# Patient Record
Sex: MALE | Race: WHITE | Employment: OTHER | URBAN - METROPOLITAN AREA
[De-identification: names, ages, dates, MRNs, and addresses within clinical notes are randomized per-mention and may not be internally consistent; named-entity substitution may affect disease eponyms.]

---

## 2017-04-04 ENCOUNTER — HOSPITAL ENCOUNTER (OUTPATIENT)
Age: 67
Setting detail: SPECIMEN
Discharge: HOME | End: 2017-04-04
Payer: MEDICARE

## 2017-04-04 LAB
ALT SERPL-CCNC: 16 U/L (ref 5–41)
AST SERPL-CCNC: 18 U/L
CHOLESTEROL/HDL RATIO: 3
CHOLESTEROL: 138 MG/DL
HDLC SERPL-MCNC: 46 MG/DL
LDL CHOLESTEROL: 71 MG/DL (ref 0–130)
TRIGL SERPL-MCNC: 104 MG/DL
VLDLC SERPL CALC-MCNC: NORMAL MG/DL (ref 1–30)

## 2017-08-06 ENCOUNTER — APPOINTMENT (OUTPATIENT)
Dept: GENERAL RADIOLOGY | Age: 67
End: 2017-08-06
Payer: MEDICARE

## 2017-08-06 ENCOUNTER — APPOINTMENT (OUTPATIENT)
Dept: CT IMAGING | Age: 67
End: 2017-08-06
Payer: MEDICARE

## 2017-08-06 ENCOUNTER — HOSPITAL ENCOUNTER (EMERGENCY)
Age: 67
Discharge: HOME | End: 2017-08-07
Attending: EMERGENCY MEDICINE
Payer: MEDICARE

## 2017-08-06 VITALS
SYSTOLIC BLOOD PRESSURE: 104 MMHG | BODY MASS INDEX: 30.48 KG/M2 | HEART RATE: 70 BPM | OXYGEN SATURATION: 96 % | RESPIRATION RATE: 16 BRPM | DIASTOLIC BLOOD PRESSURE: 51 MMHG | HEIGHT: 72 IN | WEIGHT: 225 LBS | TEMPERATURE: 98.1 F

## 2017-08-06 DIAGNOSIS — S42.211A FRACTURE, HUMERUS, NECK, RIGHT, CLOSED, INITIAL ENCOUNTER: Primary | ICD-10-CM

## 2017-08-06 PROCEDURE — 96374 THER/PROPH/DIAG INJ IV PUSH: CPT

## 2017-08-06 PROCEDURE — 73200 CT UPPER EXTREMITY W/O DYE: CPT

## 2017-08-06 PROCEDURE — 73020 X-RAY EXAM OF SHOULDER: CPT

## 2017-08-06 PROCEDURE — 29240 STRAPPING OF SHOULDER: CPT

## 2017-08-06 PROCEDURE — 99283 EMERGENCY DEPT VISIT LOW MDM: CPT

## 2017-08-06 PROCEDURE — 6360000002 HC RX W HCPCS: Performed by: NURSE PRACTITIONER

## 2017-08-06 RX ORDER — MORPHINE SULFATE 2 MG/ML
2 INJECTION, SOLUTION INTRAMUSCULAR; INTRAVENOUS ONCE
Status: DISCONTINUED | OUTPATIENT
Start: 2017-08-06 | End: 2017-08-06

## 2017-08-06 RX ORDER — ATORVASTATIN CALCIUM 20 MG/1
20 TABLET, FILM COATED ORAL DAILY
COMMUNITY

## 2017-08-06 RX ORDER — MORPHINE SULFATE 2 MG/ML
2 INJECTION, SOLUTION INTRAMUSCULAR; INTRAVENOUS ONCE
Status: COMPLETED | OUTPATIENT
Start: 2017-08-06 | End: 2017-08-06

## 2017-08-06 RX ADMIN — MORPHINE SULFATE 2 MG: 2 INJECTION, SOLUTION INTRAMUSCULAR; INTRAVENOUS at 23:02

## 2017-08-06 ASSESSMENT — PAIN DESCRIPTION - LOCATION: LOCATION: SHOULDER

## 2017-08-06 ASSESSMENT — PAIN SCALES - GENERAL
PAINLEVEL_OUTOF10: 5
PAINLEVEL_OUTOF10: 1
PAINLEVEL_OUTOF10: 5

## 2017-08-06 ASSESSMENT — PAIN DESCRIPTION - ORIENTATION: ORIENTATION: RIGHT

## 2017-08-06 ASSESSMENT — PAIN DESCRIPTION - PAIN TYPE: TYPE: ACUTE PAIN

## 2017-08-07 PROCEDURE — 96375 TX/PRO/DX INJ NEW DRUG ADDON: CPT

## 2017-08-07 PROCEDURE — 6360000002 HC RX W HCPCS: Performed by: NURSE PRACTITIONER

## 2017-08-07 RX ORDER — OXYCODONE HYDROCHLORIDE AND ACETAMINOPHEN 5; 325 MG/1; MG/1
1 TABLET ORAL EVERY 6 HOURS PRN
Qty: 20 TABLET | Refills: 0 | Status: ON HOLD | OUTPATIENT
Start: 2017-08-07 | End: 2017-12-05

## 2017-08-07 RX ADMIN — HYDROMORPHONE HYDROCHLORIDE 1 MG: 1 INJECTION, SOLUTION INTRAMUSCULAR; INTRAVENOUS; SUBCUTANEOUS at 00:44

## 2017-08-07 ASSESSMENT — PAIN SCALES - GENERAL: PAINLEVEL_OUTOF10: 10

## 2017-12-05 ENCOUNTER — APPOINTMENT (OUTPATIENT)
Dept: CT IMAGING | Age: 67
DRG: 242 | End: 2017-12-05
Attending: INTERNAL MEDICINE
Payer: MEDICARE

## 2017-12-05 ENCOUNTER — HOSPITAL ENCOUNTER (INPATIENT)
Age: 67
LOS: 3 days | Discharge: HOME | DRG: 242 | End: 2017-12-08
Attending: INTERNAL MEDICINE | Admitting: INTERNAL MEDICINE
Payer: MEDICARE

## 2017-12-05 PROBLEM — R55 CARDIAC RELATED SYNCOPE: Status: ACTIVE | Noted: 2017-12-05

## 2017-12-05 PROBLEM — R00.1 BRADYCARDIA: Status: ACTIVE | Noted: 2017-12-05

## 2017-12-05 LAB
ABSOLUTE EOS #: 0.16 K/UL (ref 0–0.44)
ABSOLUTE IMMATURE GRANULOCYTE: 0.04 K/UL (ref 0–0.3)
ABSOLUTE LYMPH #: 1.35 K/UL (ref 1.1–3.7)
ABSOLUTE MONO #: 1.05 K/UL (ref 0.1–1.2)
ANION GAP SERPL CALCULATED.3IONS-SCNC: 19 MMOL/L (ref 9–17)
BASOPHILS # BLD: 0 % (ref 0–2)
BASOPHILS ABSOLUTE: 0.03 K/UL (ref 0–0.2)
BUN BLDV-MCNC: 73 MG/DL (ref 8–23)
BUN/CREAT BLD: ABNORMAL (ref 9–20)
CALCIUM SERPL-MCNC: 8.6 MG/DL (ref 8.6–10.4)
CHLORIDE BLD-SCNC: 94 MMOL/L (ref 98–107)
CO2: 16 MMOL/L (ref 20–31)
CREAT SERPL-MCNC: 4.02 MG/DL (ref 0.7–1.2)
DIFFERENTIAL TYPE: ABNORMAL
EKG ATRIAL RATE: 33 BPM
EKG P AXIS: -16 DEGREES
EKG P-R INTERVAL: 188 MS
EKG Q-T INTERVAL: 606 MS
EKG QRS DURATION: 130 MS
EKG QTC CALCULATION (BAZETT): 448 MS
EKG R AXIS: -44 DEGREES
EKG T AXIS: 57 DEGREES
EKG VENTRICULAR RATE: 33 BPM
EOSINOPHILS RELATIVE PERCENT: 2 % (ref 1–4)
GFR AFRICAN AMERICAN: 18 ML/MIN
GFR NON-AFRICAN AMERICAN: 15 ML/MIN
GFR SERPL CREATININE-BSD FRML MDRD: ABNORMAL ML/MIN/{1.73_M2}
GFR SERPL CREATININE-BSD FRML MDRD: ABNORMAL ML/MIN/{1.73_M2}
GLUCOSE BLD-MCNC: 127 MG/DL (ref 70–99)
GLUCOSE BLD-MCNC: 148 MG/DL (ref 75–110)
HCT VFR BLD CALC: 29.3 % (ref 40.7–50.3)
HEMOGLOBIN: 8.6 G/DL (ref 13–17)
IMMATURE GRANULOCYTES: 1 %
LYMPHOCYTES # BLD: 16 % (ref 24–43)
MAGNESIUM: 2.3 MG/DL (ref 1.6–2.6)
MCH RBC QN AUTO: 26.5 PG (ref 25.2–33.5)
MCHC RBC AUTO-ENTMCNC: 29.4 G/DL (ref 28.4–34.8)
MCV RBC AUTO: 90.2 FL (ref 82.6–102.9)
MONOCYTES # BLD: 13 % (ref 3–12)
PDW BLD-RTO: 19.2 % (ref 11.8–14.4)
PLATELET # BLD: 120 K/UL (ref 138–453)
PLATELET ESTIMATE: ABNORMAL
PMV BLD AUTO: 12.4 FL (ref 8.1–13.5)
POTASSIUM SERPL-SCNC: 5.2 MMOL/L (ref 3.7–5.3)
RBC # BLD: 3.25 M/UL (ref 4.21–5.77)
RBC # BLD: ABNORMAL 10*6/UL
SEG NEUTROPHILS: 68 % (ref 36–65)
SEGMENTED NEUTROPHILS ABSOLUTE COUNT: 5.66 K/UL (ref 1.5–8.1)
SODIUM BLD-SCNC: 129 MMOL/L (ref 135–144)
THYROXINE, FREE: 1.17 NG/DL (ref 0.93–1.7)
TROPONIN INTERP: NORMAL
TROPONIN T: <0.03 NG/ML
TSH SERPL DL<=0.05 MIU/L-ACNC: 16.7 MIU/L (ref 0.3–5)
WBC # BLD: 8.3 K/UL (ref 3.5–11.3)
WBC # BLD: ABNORMAL 10*3/UL

## 2017-12-05 PROCEDURE — 2000000000 HC ICU R&B

## 2017-12-05 PROCEDURE — 85025 COMPLETE CBC W/AUTO DIFF WBC: CPT

## 2017-12-05 PROCEDURE — 36415 COLL VENOUS BLD VENIPUNCTURE: CPT

## 2017-12-05 PROCEDURE — 82947 ASSAY GLUCOSE BLOOD QUANT: CPT

## 2017-12-05 PROCEDURE — 84439 ASSAY OF FREE THYROXINE: CPT

## 2017-12-05 PROCEDURE — 6360000002 HC RX W HCPCS: Performed by: HOSPITALIST

## 2017-12-05 PROCEDURE — 93005 ELECTROCARDIOGRAM TRACING: CPT

## 2017-12-05 PROCEDURE — 84484 ASSAY OF TROPONIN QUANT: CPT

## 2017-12-05 PROCEDURE — 80048 BASIC METABOLIC PNL TOTAL CA: CPT

## 2017-12-05 PROCEDURE — 70450 CT HEAD/BRAIN W/O DYE: CPT

## 2017-12-05 PROCEDURE — 83735 ASSAY OF MAGNESIUM: CPT

## 2017-12-05 PROCEDURE — 84443 ASSAY THYROID STIM HORMONE: CPT

## 2017-12-05 RX ORDER — HYDRALAZINE HYDROCHLORIDE 20 MG/ML
10 INJECTION INTRAMUSCULAR; INTRAVENOUS EVERY 4 HOURS PRN
Status: DISCONTINUED | OUTPATIENT
Start: 2017-12-05 | End: 2017-12-08 | Stop reason: HOSPADM

## 2017-12-05 RX ORDER — ATORVASTATIN CALCIUM 20 MG/1
20 TABLET, FILM COATED ORAL DAILY
Status: DISCONTINUED | OUTPATIENT
Start: 2017-12-05 | End: 2017-12-05

## 2017-12-05 RX ORDER — DEXTROSE MONOHYDRATE 25 G/50ML
12.5 INJECTION, SOLUTION INTRAVENOUS PRN
Status: DISCONTINUED | OUTPATIENT
Start: 2017-12-05 | End: 2017-12-08 | Stop reason: HOSPADM

## 2017-12-05 RX ORDER — ATORVASTATIN CALCIUM 20 MG/1
20 TABLET, FILM COATED ORAL NIGHTLY
Status: DISCONTINUED | OUTPATIENT
Start: 2017-12-06 | End: 2017-12-08 | Stop reason: HOSPADM

## 2017-12-05 RX ORDER — SULFAMETHOXAZOLE AND TRIMETHOPRIM 400; 80 MG/1; MG/1
1 TABLET ORAL DAILY
Status: ON HOLD | COMMUNITY
End: 2017-12-08 | Stop reason: HOSPADM

## 2017-12-05 RX ORDER — NICOTINE POLACRILEX 4 MG
15 LOZENGE BUCCAL PRN
Status: DISCONTINUED | OUTPATIENT
Start: 2017-12-05 | End: 2017-12-08 | Stop reason: HOSPADM

## 2017-12-05 RX ORDER — MAGNESIUM SULFATE 1 G/100ML
1 INJECTION INTRAVENOUS PRN
Status: DISCONTINUED | OUTPATIENT
Start: 2017-12-05 | End: 2017-12-06

## 2017-12-05 RX ORDER — SODIUM CHLORIDE 0.9 % (FLUSH) 0.9 %
10 SYRINGE (ML) INJECTION EVERY 12 HOURS SCHEDULED
Status: DISCONTINUED | OUTPATIENT
Start: 2017-12-05 | End: 2017-12-08 | Stop reason: HOSPADM

## 2017-12-05 RX ORDER — AMIODARONE HYDROCHLORIDE 200 MG/1
200 TABLET ORAL 2 TIMES DAILY
Status: ON HOLD | COMMUNITY
End: 2017-12-08 | Stop reason: HOSPADM

## 2017-12-05 RX ORDER — ASPIRIN 81 MG/1
81 TABLET, CHEWABLE ORAL DAILY
Status: DISCONTINUED | OUTPATIENT
Start: 2017-12-05 | End: 2017-12-08 | Stop reason: HOSPADM

## 2017-12-05 RX ORDER — SODIUM CHLORIDE 9 MG/ML
INJECTION, SOLUTION INTRAVENOUS CONTINUOUS
Status: DISCONTINUED | OUTPATIENT
Start: 2017-12-05 | End: 2017-12-08 | Stop reason: HOSPADM

## 2017-12-05 RX ORDER — SODIUM CHLORIDE 0.9 % (FLUSH) 0.9 %
10 SYRINGE (ML) INJECTION PRN
Status: DISCONTINUED | OUTPATIENT
Start: 2017-12-05 | End: 2017-12-08 | Stop reason: HOSPADM

## 2017-12-05 RX ORDER — FUROSEMIDE 20 MG/1
20 TABLET ORAL DAILY
Status: DISCONTINUED | OUTPATIENT
Start: 2017-12-05 | End: 2017-12-05

## 2017-12-05 RX ORDER — ACETAMINOPHEN 325 MG/1
650 TABLET ORAL EVERY 4 HOURS PRN
Status: DISCONTINUED | OUTPATIENT
Start: 2017-12-05 | End: 2017-12-08 | Stop reason: HOSPADM

## 2017-12-05 RX ORDER — POTASSIUM CHLORIDE 20 MEQ/1
40 TABLET, EXTENDED RELEASE ORAL PRN
Status: DISCONTINUED | OUTPATIENT
Start: 2017-12-05 | End: 2017-12-06

## 2017-12-05 RX ORDER — DEXTROSE MONOHYDRATE 50 MG/ML
100 INJECTION, SOLUTION INTRAVENOUS PRN
Status: DISCONTINUED | OUTPATIENT
Start: 2017-12-05 | End: 2017-12-08 | Stop reason: HOSPADM

## 2017-12-05 RX ORDER — CIPROFLOXACIN 500 MG/1
500 TABLET, FILM COATED ORAL 2 TIMES DAILY
Status: ON HOLD | COMMUNITY
End: 2017-12-08 | Stop reason: HOSPADM

## 2017-12-05 RX ORDER — TOLTERODINE 4 MG/1
4 CAPSULE, EXTENDED RELEASE ORAL DAILY
COMMUNITY

## 2017-12-05 RX ORDER — CIPROFLOXACIN 500 MG/1
500 TABLET, FILM COATED ORAL 2 TIMES DAILY
Status: DISCONTINUED | OUTPATIENT
Start: 2017-12-05 | End: 2017-12-08 | Stop reason: HOSPADM

## 2017-12-05 RX ORDER — POTASSIUM CHLORIDE 20MEQ/15ML
40 LIQUID (ML) ORAL PRN
Status: DISCONTINUED | OUTPATIENT
Start: 2017-12-05 | End: 2017-12-06

## 2017-12-05 RX ORDER — ONDANSETRON 2 MG/ML
4 INJECTION INTRAMUSCULAR; INTRAVENOUS EVERY 6 HOURS PRN
Status: DISCONTINUED | OUTPATIENT
Start: 2017-12-05 | End: 2017-12-08 | Stop reason: HOSPADM

## 2017-12-05 RX ORDER — DOPAMINE HYDROCHLORIDE 160 MG/100ML
5 INJECTION, SOLUTION INTRAVENOUS CONTINUOUS
Status: DISCONTINUED | OUTPATIENT
Start: 2017-12-05 | End: 2017-12-07

## 2017-12-05 RX ORDER — POTASSIUM CHLORIDE 7.45 MG/ML
10 INJECTION INTRAVENOUS PRN
Status: DISCONTINUED | OUTPATIENT
Start: 2017-12-05 | End: 2017-12-06

## 2017-12-05 RX ADMIN — DOPAMINE HYDROCHLORIDE 10 MCG/KG/MIN: 160 INJECTION, SOLUTION INTRAVENOUS at 16:45

## 2017-12-05 NOTE — PROGRESS NOTES
Dr. John Ibarra at bedside, life pack placed on patient, order received to transfer patient to cardiac icu. EKG completed and reviewed by resident and fellow.    Electronically signed by Kaylee Mena RN on 12/5/2017 at 3:58 PM

## 2017-12-05 NOTE — CARE COORDINATION
Case Management Initial Discharge Plan  Scooterkashmir Carter,         Readmission Risk              Readmission Risk:        25.5       Age 71 or Greater:  1    Admitted from SNF or Requires Paid or Family Care:  2    Currently has CHF,COPD,ARF,CRI,or is on dialysis:  4    Takes more than 5 Prescription Medications:  4    Takes Digoxin,Insulin,Anticoagulants,Narcotics or ASA/Plavix:  [address] in Past 12 Months:  10    On Disability:  0    Patient Considers own Health:  2.5            Met with:patient to discuss discharge plans. Information verified: address, contacts, phone number, , insurance Yes  PCP: Isamar Herron MD  Date of last visit: Dec 2017    Insurance Provider: Medicare    Discharge Planning  Current Residence:  Private Residence  Living Arrangements:  Spouse/Significant Other   Home has 2 stories/10 stairs to climb  Support Systems:  Spouse/Significant Other, Family Members, Home Care Staff  Current Services PTA:  Durable Medical Equipment, Home Care   Patient able to perform ADL's:Independent  DME used to aid ambulation prior to admission: walker, cane    Potential Assistance Needed:  [address]: [place] on [facility] [address] [facility]   Potential Assistance Purchasing Medications:  No  Does patient want to participate in local refill/ meds to beds program?  No    Patient agreeable to home care: Yes  Freedom of choice provided:  Yes-current with [facility]-faxed face sheet      Type of Home Care Services:   (ST)  Patient expects to be discharged to:  home    Prior SNF/Rehab Placement and Facility: N/A  Agreeable to SNF/Rehab: No  [place] of choice provided: n/a   Evaluation: n/a    Expected Discharge date:  17  Follow Up Appointment: Best Day/ Time: Monday AM    Transportation provider: wife  Transportation arrangements needed for discharge: No    Discharge Plan: home    Has [facility].         Electronically signed by Ly Ortega RN on 17 at

## 2017-12-05 NOTE — PROGRESS NOTES
Page sent to attending team to notify of patient's arrival to unit.    Electronically signed by Ho Will RN on 12/5/2017 at 2:11 PM

## 2017-12-05 NOTE — H&P
Attestation signed by      Attending Physician Statement:    I have discussed the care of  [facility] , including pertinent history and exam findings, with the Cardiology fellow/resident. I have seen and examined the patient and the key elements of all parts of the encounter have been performed by me. I agree with the assessment, plan and orders as documented by the fellow/resident, after I modified exam findings and plan of treatments, and the final version is my approved version of the assessment. Additional Comments:     Symptomatic Sinus Bradycardia / ? SSS given P. Afib   - however does have reversible causes for bradycardia including (BB, Amio use, low thyroid)  - hold BB, amio, start thyroid  - wean dopamine  - discuss with EP need for PPM (as he may need to be on BB and Amio to prevent AF with RVR)  - discuss with EP if we can start on [facility] with heparin drip (or continue holding for possible PPM)    Thank you for allowing me to participate in the care of this patient, please do not hesitate to call if you have any questions. Dottie Cooks, [id number] [facility]  [facility] [address]  [phone]     [facility] Cardiology    Consult / H&P               Today's Date: 12/5/2017  Patient Name: [facility]  Date of admission: 12/5/2017  1:44 PM  Patient's age: 75 y. o., 1950  Admission Dx: Cardiac related syncope [R55]    Reason for Consult:  Cardiac evaluation    Requesting Physician: Amy Booth MD    CHIEF COMPLAINT:  Symptomatic bradycardia     History Obtained From:  patient, electronic medical record    HISTORY OF PRESENT ILLNESS:      The patient is a 75 y.o.  male who is admitted to the hospital from the clinic due to symptomaticatic bradycardia .    Since the past week patient has been feeling too weak and dizziness and this morning he went to the doctor where he was found to be bradycardic   Patient has PMH of HTN, AFIB, aortic aneurysm repair and pre diabetes. Patient is on Coreg and Amiodarone and Xarelto - last does of Xarelto on 12/4/17   Patient endorses no chest pain , SOB     Past Medical History:   has a past medical history of A-fib ([facility] [address] Anemia; Ascending aortic aneurysm (HCC); CAD (coronary artery disease); CHF (congestive heart failure) ([facility] [address] Diabetes mellitus ([facility] [address] Hyperlipidemia; Hypertension; Movement disorder; Multiple sclerosis ([facility] [address] Neurogenic bladder; Neuromuscular disorder ([facility] [address] Other disorders of kidney and ureter in diseases classified elsewhere; Pneumonia; Rib fractures; and Type 1 dissection of ascending aorta ([facility] [address] Past Surgical History:   has a past surgical history that includes Abdominal aortic aneurysm repair (8/9/12); Appendectomy; Achilles tendon surgery; and TURP. Home Medications:    Prior to Admission medications    Medication Sig Start Date End Date Taking? Authorizing Provider   amiodarone (CORDARONE) 200 MG tablet Take 200 mg by mouth 2 times daily   Yes Historical Provider, MD   ciprofloxacin (CIPRO) 500 MG tablet Take 500 mg by mouth 2 times daily   Yes Historical Provider, MD   tolterodine (DETROL LA) 4 MG extended release capsule Take 4 mg by mouth daily   Yes Historical Provider, MD   sulfamethoxazole-trimethoprim (BACTRIM;SEPTRA) 400-80 MG per tablet Take 1 tablet by mouth daily   Yes Historical Provider, MD   rivaroxaban (XARELTO) 20 MG TABS tablet Take 20 mg by mouth   Yes Historical Provider, MD   atorvastatin (LIPITOR) 20 MG tablet Take 20 mg by mouth daily   Yes Historical Provider, MD   carvedilol (COREG) 6.25 MG tablet Take 2 tablets by mouth 2 times daily (with meals). 11/13/14  Yes Meeta Green MD   furosemide (LASIX) 40 MG tablet Take 1 tablet by mouth daily. Patient taking differently: Take 20 mg by mouth daily  7/13/14  Yes Armando Patton NP   lisinopril (PRINIVIL;ZESTRIL) 20 MG tablet Take 20 mg by mouth 2 times daily.    Yes Historical Provider, MD   losartan (COZAAR) 100 MG tablet Take 100 mg by mouth daily    Yes Historical Provider, MD   cloNIDine (CATAPRES) 0.1 MG tablet Take 0.2 mg by mouth 2 times daily    Yes Historical Provider, MD   hydrALAZINE (APRESOLINE) 25 MG tablet Take 50 mg by mouth 2 times daily    Yes Historical Provider, MD   Timolol Maleate 0.5 % (DAILY) SOLN Apply 1 drop to eye daily    Yes Historical Provider, MD   metformin (GLUCOPHAGE) 500 MG tablet Take 500 mg by mouth daily (with breakfast). Yes Historical Provider, MD   aspirin 325 MG tablet Take 325 mg by mouth daily. Yes Historical Provider, MD      Current Facility-Administered Medications: ciprofloxacin (CIPRO) tablet 500 mg, 500 mg, Oral, BID  sodium chloride flush 0.9 % injection 10 mL, 10 mL, Intravenous, 2 times per day  sodium chloride flush 0.9 % injection 10 mL, 10 mL, Intravenous, PRN  acetaminophen (TYLENOL) tablet 650 mg, 650 mg, Oral, Q4H PRN  magnesium hydroxide (MILK OF MAGNESIA) 400 MG/5ML suspension 30 mL, 30 mL, Oral, Daily PRN  ondansetron (ZOFRAN) injection 4 mg, 4 mg, Intravenous, Q6H PRN  potassium chloride (KLOR-CON M) extended release tablet 40 mEq, 40 mEq, Oral, PRN **OR** potassium chloride 20 MEQ/15ML (10%) oral solution 40 mEq, 40 mEq, Oral, PRN **OR** potassium chloride 10 mEq/100 mL IVPB (Peripheral Line), 10 mEq, Intravenous, PRN  magnesium sulfate 1 g in dextrose 5% 100 mL IVPB, 1 g, Intravenous, PRN  hydrALAZINE (APRESOLINE) injection 10 mg, 10 mg, Intravenous, Q4H PRN    Allergies:  Review of patient's allergies indicates no known allergies. Social History:   reports that he has never smoked. He has never used smokeless tobacco. He reports that he does not drink alcohol or use drugs. Family History: family history includes Cancer in his father; Diabetes in his brother, mother, and sister; Hypertension in his mother and sister. No h/o sudden cardiac death. No for premature CAD    REVIEW OF SYSTEMS:    · Constitutional: there has been no unanticipated weight last 72 hours. BMP: No results for input(s): NA, K, CO2, BUN, CREATININE, LABGLOM, GLUCOSE in the last 72 hours. BNP: No results for input(s): BNP in the last 72 hours. PT/INR: No results for input(s): PROTIME, INR in the last 72 hours. APTT:No results for input(s): APTT in the last 72 hours. CARDIAC ENZYMES:No results for input(s): CKTOTAL, CKMB, CKMBINDEX, TROPONINI in the last 72 hours. FASTING LIPID PANEL:  Lab Results   Component Value Date    HDL 46 04/04/2017    TRIG 104 04/04/2017     LIVER PROFILE:No results for input(s): AST, ALT, LABALBU in the last 72 hours. IMPRESSION:    Patient Active Problem List   Diagnosis    Type 1 dissection of ascending aorta (HCC)    Hypertension    Ascending aortic aneurysm (HCC)    Multiple sclerosis (HCC)    Rib fractures    Neuromuscular disorder (HCC)    Acute CHF (congestive heart failure) (HCC)    Diabetes mellitus (HCC)    Pneumonia    Neurogenic bladder    Fever    Hypotension    Anemia    Hyperglycemia    Dyspnea and respiratory abnormalities    Congestive heart failure (HCC)    Cardiac related syncope       RECOMMENDATIONS:  Principal Problem:    Bradycardia  Active Problems:    Hypertension    Cardiac related syncope      1. Sinus Bradycardia. Check TSH and labwork. Check 2D echocardiogram to rule out structural heart disease. Stop medications that may cause gema arrhythmias. Monitor telemetry for abnormal rhythms. Hold anticoagulation for now   Transfer to CVICU   Start Dopamine infusion   Will attach transcutaneous pacer   Likely to go for PPM       Will discuss with rounding attending Dr. Lalit Ardon for final recommendations. Saundra Lechuga, PGY-2, Internal Medicine Residency Resident.   [facility], [facility]

## 2017-12-06 ENCOUNTER — APPOINTMENT (OUTPATIENT)
Dept: ULTRASOUND IMAGING | Age: 67
DRG: 242 | End: 2017-12-06
Attending: INTERNAL MEDICINE
Payer: MEDICARE

## 2017-12-06 PROBLEM — Z87.440 HISTORY OF CHRONIC URINARY TRACT INFECTION: Status: ACTIVE | Noted: 2017-12-06

## 2017-12-06 PROBLEM — E03.9 ACQUIRED HYPOTHYROIDISM: Status: ACTIVE | Noted: 2017-12-06

## 2017-12-06 PROBLEM — N17.9 ACUTE KIDNEY INJURY (HCC): Status: ACTIVE | Noted: 2017-12-06

## 2017-12-06 LAB
-: ABNORMAL
AMORPHOUS: ABNORMAL
ANION GAP SERPL CALCULATED.3IONS-SCNC: 17 MMOL/L (ref 9–17)
BACTERIA: ABNORMAL
BILIRUBIN URINE: NEGATIVE
BUN BLDV-MCNC: 58 MG/DL (ref 8–23)
BUN/CREAT BLD: ABNORMAL (ref 9–20)
CALCIUM SERPL-MCNC: 8.9 MG/DL (ref 8.6–10.4)
CASTS UA: ABNORMAL /LPF (ref 0–8)
CHLORIDE BLD-SCNC: 96 MMOL/L (ref 98–107)
CO2: 19 MMOL/L (ref 20–31)
COLOR: YELLOW
COMPLEMENT C3: 112 MG/DL (ref 90–180)
COMPLEMENT C4: 33 MG/DL (ref 10–40)
CREAT SERPL-MCNC: 2.95 MG/DL (ref 0.7–1.2)
CREATININE URINE: 53.4 MG/DL (ref 39–259)
CRYSTALS, UA: ABNORMAL /HPF
EPITHELIAL CELLS UA: ABNORMAL /HPF (ref 0–5)
FREE KAPPA/LAMBDA RATIO: 1.47 (ref 0.26–1.65)
GFR AFRICAN AMERICAN: 26 ML/MIN
GFR NON-AFRICAN AMERICAN: 21 ML/MIN
GFR SERPL CREATININE-BSD FRML MDRD: ABNORMAL ML/MIN/{1.73_M2}
GFR SERPL CREATININE-BSD FRML MDRD: ABNORMAL ML/MIN/{1.73_M2}
GLUCOSE BLD-MCNC: 100 MG/DL (ref 75–110)
GLUCOSE BLD-MCNC: 159 MG/DL (ref 75–110)
GLUCOSE BLD-MCNC: 162 MG/DL (ref 75–110)
GLUCOSE BLD-MCNC: 170 MG/DL (ref 70–99)
GLUCOSE BLD-MCNC: 173 MG/DL (ref 75–110)
GLUCOSE URINE: NEGATIVE
HAV IGM SER IA-ACNC: NONREACTIVE
HEPATITIS B CORE IGM ANTIBODY: NONREACTIVE
HEPATITIS B SURFACE ANTIGEN: NONREACTIVE
HEPATITIS C ANTIBODY: NONREACTIVE
KAPPA FREE LIGHT CHAINS QNT: 2.51 MG/DL (ref 0.37–1.94)
KETONES, URINE: NEGATIVE
LAMBDA FREE LIGHT CHAINS QNT: 1.71 MG/DL (ref 0.57–2.63)
LEUKOCYTE ESTERASE, URINE: ABNORMAL
MAGNESIUM: 2.2 MG/DL (ref 1.6–2.6)
MUCUS: ABNORMAL
NITRITE, URINE: NEGATIVE
OTHER OBSERVATIONS UA: ABNORMAL
PARTIAL THROMBOPLASTIN TIME: 25.4 SEC (ref 21.3–31.3)
PARTIAL THROMBOPLASTIN TIME: 41.1 SEC (ref 21.3–31.3)
PH UA: 5 (ref 5–8)
PHOSPHORUS: 3.8 MG/DL (ref 2.5–4.5)
POTASSIUM SERPL-SCNC: 4.2 MMOL/L (ref 3.7–5.3)
POTASSIUM, UR: 23.3 MMOL/L
PROTEIN UA: NEGATIVE
RBC UA: ABNORMAL /HPF (ref 0–4)
RENAL EPITHELIAL, UA: ABNORMAL /HPF
SODIUM BLD-SCNC: 132 MMOL/L (ref 135–144)
SODIUM,UR: 66 MMOL/L
SPECIFIC GRAVITY UA: 1.01 (ref 1–1.03)
T3 FREE: 1.82 PG/ML (ref 2.02–4.43)
TOTAL PROTEIN, URINE: 8 MG/DL
TRICHOMONAS: ABNORMAL
TURBIDITY: CLEAR
URINE HGB: NEGATIVE
UROBILINOGEN, URINE: NORMAL
WBC UA: ABNORMAL /HPF (ref 0–5)
YEAST: ABNORMAL

## 2017-12-06 PROCEDURE — 2000000000 HC ICU R&B

## 2017-12-06 PROCEDURE — 6370000000 HC RX 637 (ALT 250 FOR IP): Performed by: INTERNAL MEDICINE

## 2017-12-06 PROCEDURE — 84481 FREE ASSAY (FT-3): CPT

## 2017-12-06 PROCEDURE — 84300 ASSAY OF URINE SODIUM: CPT

## 2017-12-06 PROCEDURE — 36415 COLL VENOUS BLD VENIPUNCTURE: CPT

## 2017-12-06 PROCEDURE — 85730 THROMBOPLASTIN TIME PARTIAL: CPT

## 2017-12-06 PROCEDURE — 6360000002 HC RX W HCPCS: Performed by: INTERNAL MEDICINE

## 2017-12-06 PROCEDURE — 82570 ASSAY OF URINE CREATININE: CPT

## 2017-12-06 PROCEDURE — 81001 URINALYSIS AUTO W/SCOPE: CPT

## 2017-12-06 PROCEDURE — 84133 ASSAY OF URINE POTASSIUM: CPT

## 2017-12-06 PROCEDURE — 82947 ASSAY GLUCOSE BLOOD QUANT: CPT

## 2017-12-06 PROCEDURE — 80048 BASIC METABOLIC PNL TOTAL CA: CPT

## 2017-12-06 PROCEDURE — 97530 THERAPEUTIC ACTIVITIES: CPT

## 2017-12-06 PROCEDURE — 6370000000 HC RX 637 (ALT 250 FOR IP): Performed by: HOSPITALIST

## 2017-12-06 PROCEDURE — 84482 T3 REVERSE: CPT

## 2017-12-06 PROCEDURE — G8988 SELF CARE GOAL STATUS: HCPCS

## 2017-12-06 PROCEDURE — G8978 MOBILITY CURRENT STATUS: HCPCS

## 2017-12-06 PROCEDURE — 83516 IMMUNOASSAY NONANTIBODY: CPT

## 2017-12-06 PROCEDURE — 84156 ASSAY OF PROTEIN URINE: CPT

## 2017-12-06 PROCEDURE — 94762 N-INVAS EAR/PLS OXIMTRY CONT: CPT

## 2017-12-06 PROCEDURE — 76770 US EXAM ABDO BACK WALL COMP: CPT

## 2017-12-06 PROCEDURE — G8979 MOBILITY GOAL STATUS: HCPCS

## 2017-12-06 PROCEDURE — 83883 ASSAY NEPHELOMETRY NOT SPEC: CPT

## 2017-12-06 PROCEDURE — 86038 ANTINUCLEAR ANTIBODIES: CPT

## 2017-12-06 PROCEDURE — 83735 ASSAY OF MAGNESIUM: CPT

## 2017-12-06 PROCEDURE — 97162 PT EVAL MOD COMPLEX 30 MIN: CPT

## 2017-12-06 PROCEDURE — 80074 ACUTE HEPATITIS PANEL: CPT

## 2017-12-06 PROCEDURE — G8987 SELF CARE CURRENT STATUS: HCPCS

## 2017-12-06 PROCEDURE — 86160 COMPLEMENT ANTIGEN: CPT

## 2017-12-06 PROCEDURE — 97166 OT EVAL MOD COMPLEX 45 MIN: CPT

## 2017-12-06 PROCEDURE — 2580000003 HC RX 258: Performed by: INTERNAL MEDICINE

## 2017-12-06 PROCEDURE — 97535 SELF CARE MNGMENT TRAINING: CPT

## 2017-12-06 PROCEDURE — 99221 1ST HOSP IP/OBS SF/LOW 40: CPT | Performed by: INTERNAL MEDICINE

## 2017-12-06 PROCEDURE — 97165 OT EVAL LOW COMPLEX 30 MIN: CPT

## 2017-12-06 PROCEDURE — 6360000002 HC RX W HCPCS: Performed by: HOSPITALIST

## 2017-12-06 PROCEDURE — 84100 ASSAY OF PHOSPHORUS: CPT

## 2017-12-06 RX ORDER — HEPARIN SODIUM 1000 [USP'U]/ML
2000 INJECTION, SOLUTION INTRAVENOUS; SUBCUTANEOUS PRN
Status: DISCONTINUED | OUTPATIENT
Start: 2017-12-06 | End: 2017-12-07

## 2017-12-06 RX ORDER — POTASSIUM CHLORIDE 20 MEQ/1
40 TABLET, EXTENDED RELEASE ORAL PRN
Status: DISCONTINUED | OUTPATIENT
Start: 2017-12-06 | End: 2017-12-06

## 2017-12-06 RX ORDER — HEPARIN SODIUM 10000 [USP'U]/100ML
1000 INJECTION, SOLUTION INTRAVENOUS CONTINUOUS
Status: DISCONTINUED | OUTPATIENT
Start: 2017-12-06 | End: 2017-12-07

## 2017-12-06 RX ORDER — HEPARIN SODIUM 1000 [USP'U]/ML
4000 INJECTION, SOLUTION INTRAVENOUS; SUBCUTANEOUS PRN
Status: DISCONTINUED | OUTPATIENT
Start: 2017-12-06 | End: 2017-12-07

## 2017-12-06 RX ORDER — POTASSIUM CHLORIDE 20MEQ/15ML
40 LIQUID (ML) ORAL PRN
Status: DISCONTINUED | OUTPATIENT
Start: 2017-12-06 | End: 2017-12-06

## 2017-12-06 RX ORDER — MAGNESIUM SULFATE 1 G/100ML
1 INJECTION INTRAVENOUS PRN
Status: DISCONTINUED | OUTPATIENT
Start: 2017-12-06 | End: 2017-12-06

## 2017-12-06 RX ORDER — LEVOTHYROXINE SODIUM 0.03 MG/1
25 TABLET ORAL DAILY
Status: DISCONTINUED | OUTPATIENT
Start: 2017-12-06 | End: 2017-12-08 | Stop reason: HOSPADM

## 2017-12-06 RX ORDER — HEPARIN SODIUM 1000 [USP'U]/ML
4000 INJECTION, SOLUTION INTRAVENOUS; SUBCUTANEOUS ONCE
Status: COMPLETED | OUTPATIENT
Start: 2017-12-06 | End: 2017-12-06

## 2017-12-06 RX ORDER — POTASSIUM CHLORIDE 7.45 MG/ML
10 INJECTION INTRAVENOUS PRN
Status: DISCONTINUED | OUTPATIENT
Start: 2017-12-06 | End: 2017-12-06

## 2017-12-06 RX ADMIN — SODIUM CHLORIDE: 9 INJECTION, SOLUTION INTRAVENOUS at 00:33

## 2017-12-06 RX ADMIN — INSULIN LISPRO 1 UNITS: 100 INJECTION, SOLUTION INTRAVENOUS; SUBCUTANEOUS at 12:51

## 2017-12-06 RX ADMIN — DOPAMINE HYDROCHLORIDE 4 MCG/KG/MIN: 160 INJECTION, SOLUTION INTRAVENOUS at 20:33

## 2017-12-06 RX ADMIN — DOPAMINE HYDROCHLORIDE 5 MCG/KG/MIN: 160 INJECTION, SOLUTION INTRAVENOUS at 04:30

## 2017-12-06 RX ADMIN — ACETAMINOPHEN 650 MG: 325 TABLET ORAL at 12:51

## 2017-12-06 RX ADMIN — INSULIN LISPRO 1 UNITS: 100 INJECTION, SOLUTION INTRAVENOUS; SUBCUTANEOUS at 18:56

## 2017-12-06 RX ADMIN — ATORVASTATIN CALCIUM 20 MG: 20 TABLET, FILM COATED ORAL at 21:22

## 2017-12-06 RX ADMIN — CIPROFLOXACIN 500 MG: 500 TABLET, FILM COATED ORAL at 21:22

## 2017-12-06 RX ADMIN — HEPARIN SODIUM 4000 UNITS: 1000 INJECTION, SOLUTION INTRAVENOUS; SUBCUTANEOUS at 16:36

## 2017-12-06 RX ADMIN — CIPROFLOXACIN 500 MG: 500 TABLET, FILM COATED ORAL at 11:06

## 2017-12-06 RX ADMIN — HEPARIN SODIUM AND DEXTROSE 1000 UNITS/HR: 10000; 5 INJECTION INTRAVENOUS at 16:36

## 2017-12-06 RX ADMIN — CIPROFLOXACIN 500 MG: 500 TABLET, FILM COATED ORAL at 00:31

## 2017-12-06 RX ADMIN — LEVOTHYROXINE SODIUM 25 MCG: 25 TABLET ORAL at 16:35

## 2017-12-06 ASSESSMENT — ENCOUNTER SYMPTOMS
SHORTNESS OF BREATH: 1
WHEEZING: 0
BLURRED VISION: 0
SPUTUM PRODUCTION: 0
COUGH: 0
NAUSEA: 0
HEMOPTYSIS: 0
VOMITING: 0
BLOOD IN STOOL: 0

## 2017-12-06 ASSESSMENT — PAIN SCALES - GENERAL
PAINLEVEL_OUTOF10: 3
PAINLEVEL_OUTOF10: 2
PAINLEVEL_OUTOF10: 0
PAINLEVEL_OUTOF10: 0

## 2017-12-06 ASSESSMENT — PAIN DESCRIPTION - LOCATION: LOCATION: HEAD

## 2017-12-06 ASSESSMENT — PAIN DESCRIPTION - PAIN TYPE: TYPE: ACUTE PAIN

## 2017-12-06 ASSESSMENT — PAIN DESCRIPTION - ORIENTATION: ORIENTATION: ANTERIOR;MID

## 2017-12-06 NOTE — CONSULTS
mg/dL    Sodium 129 (L) 135 - 144 mmol/L    Potassium 5.2 3.7 - 5.3 mmol/L    Chloride 94 (L) 98 - 107 mmol/L    CO2 16 (L) 20 - 31 mmol/L    Anion Gap 19 (H) 9 - 17 mmol/L    GFR Non-African American 15 (L) >60 mL/min    GFR  18 (L) >60 mL/min    GFR Comment          GFR Staging NOT REPORTED    CBC auto differential    Collection Time: 12/05/17  4:27 PM   Result Value Ref Range    WBC 8.3 3.5 - 11.3 k/uL    RBC 3.25 (L) 4.21 - 5.77 m/uL    Hemoglobin 8.6 (L) 13.0 - 17.0 g/dL    Hematocrit 29.3 (L) 40.7 - 50.3 %    MCV 90.2 82.6 - 102.9 fL    MCH 26.5 25.2 - 33.5 pg    MCHC 29.4 28.4 - 34.8 g/dL    RDW 19.2 (H) 11.8 - 14.4 %    Platelets 084 (L) 639 - 453 k/uL    MPV 12.4 8.1 - 13.5 fL    Differential Type NOT REPORTED     Seg Neutrophils 68 (H) 36 - 65 %    Lymphocytes 16 (L) 24 - 43 %    Monocytes 13 (H) 3 - 12 %    Eosinophils % 2 1 - 4 %    Basophils 0 0 - 2 %    Immature Granulocytes 1 (H) 0 %    Segs Absolute 5.66 1.50 - 8.10 k/uL    Absolute Lymph # 1.35 1.10 - 3.70 k/uL    Absolute Mono # 1.05 0.10 - 1.20 k/uL    Absolute Eos # 0.16 0.00 - 0.44 k/uL    Basophils # 0.03 0.00 - 0.20 k/uL    Absolute Immature Granulocyte 0.04 0.00 - 0.30 k/uL    WBC Morphology NOT REPORTED     RBC Morphology ANISOCYTOSIS PRESENT     Platelet Estimate NOT REPORTED    Magnesium    Collection Time: 12/05/17  4:27 PM   Result Value Ref Range    Magnesium 2.3 1.6 - 2.6 mg/dL   Troponin    Collection Time: 12/05/17  4:27 PM   Result Value Ref Range    Troponin T <0.03 <0.03 ng/mL    Troponin Interp         TSH with Reflex    Collection Time: 12/05/17  4:27 PM   Result Value Ref Range    TSH 16.70 (H) 0.30 - 5.00 mIU/L   T4, Free    Collection Time: 12/05/17  4:27 PM   Result Value Ref Range    Thyroxine, Free 1.17 0.93 - 1.70 ng/dL   POC Glucose Fingerstick    Collection Time: 12/05/17  5:05 PM   Result Value Ref Range    POC Glucose 148 (H) 75 - 110 mg/dL   BASIC METABOLIC PANEL    Collection Time: 12/06/17  4:06 AM Result Value Ref Range    Glucose 170 (H) 70 - 99 mg/dL    BUN 58 (H) 8 - 23 mg/dL    CREATININE 2.95 (H) 0.70 - 1.20 mg/dL    Bun/Cre Ratio NOT REPORTED 9 - 20    Calcium 8.9 8.6 - 10.4 mg/dL    Sodium 132 (L) 135 - 144 mmol/L    Potassium 4.2 3.7 - 5.3 mmol/L    Chloride 96 (L) 98 - 107 mmol/L    CO2 19 (L) 20 - 31 mmol/L    Anion Gap 17 9 - 17 mmol/L    GFR Non-African American 21 (L) >60 mL/min    GFR  26 (L) >60 mL/min    GFR Comment          GFR Staging NOT REPORTED    MAGNESIUM    Collection Time: 12/06/17  4:06 AM   Result Value Ref Range    Magnesium 2.2 1.6 - 2.6 mg/dL   PHOSPHORUS    Collection Time: 12/06/17  4:06 AM   Result Value Ref Range    Phosphorus 3.8 2.5 - 4.5 mg/dL   T3, FREE    Collection Time: 12/06/17  4:06 AM   Result Value Ref Range    T3, Free 1.82 (L) 2.02 - 4.43 pg/mL   POC Glucose Fingerstick    Collection Time: 12/06/17  6:50 AM   Result Value Ref Range    POC Glucose 159 (H) 75 - 110 mg/dL     CT:   Impression:       The ventricles are dilated out of proportion to the sulci.  Normal pressure  hydrocephalus cannot be excluded.  Chronic small vessel ischemic changes.  No  acute brain parenchymal abnormality. Results for Devorah Ly (MRN [id number]) as of 12/6/2017 09:50   Ref. Range 8/9/2012 12:08 7/11/2014 20:00 12/5/2017 16:27   TSH Latest Ref Range: 0.30 - 5.00 mIU/L 2.04 1.29 16.70 (H)   Results for Devorah Felt (MRN [id number]) as of 12/6/2017 09:50   Ref. Range 12/5/2017 16:27   Thyroxine, Free Latest Ref Range: 0.93 - 1.70 ng/dL 1.17   Results for Devorah Felt (MRN [id number]) as of 12/6/2017 09:50   Ref. Range 12/6/2017 04:06   T3, Free Latest Ref Range: 2.02 - 4.43 pg/mL 1.82 (L)     Results for Devorah Ly (MRN [id number]) as of 12/6/2017 09:50   Ref.  Range 7/13/2014 05:07   Hemoglobin A1C Latest Ref Range: 4.0 - 6.0 % 6.1 (H)       IMPRESSION/RECOMMENDATIONS:  Findings:   Bradycardia  Active Hospital Problems    Diagnosis Date Noted

## 2017-12-06 NOTE — PROGRESS NOTES
[facility]   Progress Note                   Date:   12/6/2017  Patient name: Caryle Murdoch  Date of admission:  12/5/2017  1:44 PM  MRN:   [id number]  YOB: 1950  PCP: Wilfredo Addison MD    Reason for Admission:  Marked sinus bradycardia     Subjective:       Overnight events noted, CT head reviewed   HR improved on low dose dopamine infusion ranging 50-60/min   BP stable   He is awake and oriented this AM and denies any chest pain, dyspnea or light headedness   Cr improved to 2.95 this AM. K normal.  TSH elevated        Medications:   Scheduled Meds:   ciprofloxacin  500 mg Oral BID    sodium chloride flush  10 mL Intravenous 2 times per day    aspirin  81 mg Oral Daily    insulin lispro  0-6 Units Subcutaneous TID WC    insulin lispro  0-3 Units Subcutaneous Nightly    atorvastatin  20 mg Oral Nightly       Continuous Infusions:   DOPamine 5 mcg/kg/min (12/06/17 0430)    dextrose      sodium chloride 100 mL/hr at 12/06/17 0033       CBC:   Recent Labs      12/05/17   1627   WBC  8.3   HGB  8.6*   PLT  120*     BMP:    Recent Labs      12/05/17   1627  12/06/17   0406   NA  129*  132*   K  5.2  4.2   CL  94*  96*   CO2  16*  19*   BUN  73*  58*   CREATININE  4.02*  2.95*   GLUCOSE  127*  170*         Objective:   Vitals: BP (!) 144/47   Pulse 62   Temp 97.7 °F (36.5 °C) (Axillary)   Resp 14   Ht 6' (1.829 m)   Wt 237 lb (107.5 kg)   SpO2 94%   BMI 32.14 kg/m²     General appearance: awake, lethargic, oriented, in no apparent respiratory distress   HEENT: Head: Normocephalic, no lesions,  Neck: no JVD  Lungs: clear to auscultation bilaterally, minimal basilar rales, no wheezing   Heart: regular rate and rhythm, S1, S2 normal, grade 3/6 ESM over precordium, no rub or gallop  Abdomen: soft, non-tender; bowel sounds normal  Extremities: No LE edema  Neurologic: Mental status: Alert, oriented. Motor and sensory not done. EKG 12/5/2017  Marked sinus bradycardia, LAD. LVH.      Echocardiogram 8/20/2017            Assessment / Acute Cardiac Problems:   Symptomatic sinus bradycardia,  Paroxysmal atrial fibrillation, CHADS-VASc:3  Acute renal failure, pre renal due to reduced cardiac output from severe bradycardia  Hypothyroidism   Hx of dissection of ascending aorta with anuerysm s/p repair in 2012   Hypertension  Multiple sclerosis  Diabetes mellitus   Neurogenic bladder        Plan of Treatment:   1. EP evaluation for PPM placement   2. Continue low dose dopamine and IV hydration   3. Start synthroid   4. Nephrology consult for SOURAV, initial work up ordered  5. Will need neurology evaluation as OP for possible NPH (incident finding on CT head)   6. Continue to hold xarelto for now     Discussed with patient and nursing. Kin Allison MD  Fellow, Cardiovascular Diseases   [facility]   Pager - [phone]    Attending Cardiologist Addendum: I have reviewed and performed the history, physical, subjective, objective, assessment, and plan with the resident/fellow and agree with the note. I performed the history and physical personally. I have made changes to the note above as needed. Symptomatic Sinus Bradycardia / ? SSS given P. Afib   - however does have reversible causes for bradycardia including (BB, Amio use, low thyroid)  - hold BB, amio, start thyroid  - wean dopamine  - discuss with EP need for PPM (as he may need to be on BB and Amio to prevent AF with RVR)  - [facility] (or continue holding for possible PPM)     Thank you for allowing me to participate in the care of this patient, please do not hesitate to call if you have any questions. Harsh Pompa, [id number] [facility]  [email or web address] [employer]  [phone]

## 2017-12-06 NOTE — PLAN OF CARE
[address] [place] [address]       Second Visit Note  For more detailed information please refer to the progress note of the day      12/6/2017    3:54 PM    Name:   Jenny Jackson  MRN:     [id number]     Acct:      [de-identified]   Room:   [address] [place] [address] Day:  1  Admit Date:  12/5/2017  1:44 PM    PCP:   Jaci Rivera MD  Code Status:  Full Code    Patient was seen  Comfortable  Up in the chair  No dizziness/orthostasis  Reports he is on Cipro for a recent UTI    CURRENT MEDS:     heparin (porcine) injection 4,000 Units PRN   heparin (porcine) injection 2,000 Units PRN   heparin (porcine) injection 4,000 Units Once   heparin 25,000 units in dextrose 5% 250 mL infusion Continuous   ciprofloxacin (CIPRO) tablet 500 mg BID   sodium chloride flush 0.9 % injection 10 mL 2 times per day   sodium chloride flush 0.9 % injection 10 mL PRN   acetaminophen (TYLENOL) tablet 650 mg Q4H PRN   magnesium hydroxide (MILK OF MAGNESIA) 400 MG/5ML suspension 30 mL Daily PRN   ondansetron (ZOFRAN) injection 4 mg Q6H PRN   potassium chloride (KLOR-CON M) extended release tablet 40 mEq PRN   Or    potassium chloride 20 MEQ/15ML (10%) oral solution 40 mEq PRN   Or    potassium chloride 10 mEq/100 mL IVPB (Peripheral Line) PRN   magnesium sulfate 1 g in dextrose 5% 100 mL IVPB PRN   hydrALAZINE (APRESOLINE) injection 10 mg Q4H PRN   DOPamine (INTROPIN) 400 mg in dextrose 5 % 250 mL infusion Continuous   aspirin chewable tablet 81 mg Daily   glucose (GLUTOSE) 40 % oral gel 15 g PRN   dextrose 50 % solution 12.5 g PRN   glucagon (rDNA) injection 1 mg PRN   dextrose 5 % solution PRN   insulin lispro (HUMALOG) injection vial 0-6 Units TID WC   insulin lispro (HUMALOG) injection vial 0-3 Units Nightly   atorvastatin (LIPITOR) tablet 20 mg Nightly   0.9 % sodium chloride infusion Continuous       VITALS:  BP (!) 144/54   Pulse 57   Temp 98.4 °F (36.9 °C) (Oral)   Resp 16   Ht 6' (1.829 m)   Wt 237 lb (107.5 kg)   SpO2 99%   BMI

## 2017-12-06 NOTE — PROGRESS NOTES
Patient monitor alarming. RN to room to check on patient, patient found on floor sitting in an upright position with legs extended. Patient assisted off floor to bed via 2 RNs. Initial assessment - no signs of injury, bleeding, bruising, or swelling. No complaint of pain. No distress noted. No new neurological deficits noted. Vitals stable. Patient states he was getting up to the bathroom, got dizzy and fell. States he did hit the back of his head. Dr. Shadi Hernandez notified, CT and q2h neurological checks ordered. Ct obtained - negative for any acute abnormalities. Upon returning from CT, a gumball sized bump noted to back of patient's head. Telesitter initiated, high fall risk, fall wrist band applied, bed alarm initiated. Education reinforced regarding calling for assistance. Charge nurse, clinical manager notified.

## 2017-12-06 NOTE — PLAN OF CARE
Problem: Falls - Risk of  Goal: Absence of falls  Outcome: Ongoing  No falls this shift. Problem: Risk for Impaired Skin Integrity  Goal: Tissue integrity - skin and mucous membranes  Structural intactness and normal physiological function of skin and  mucous membranes. Outcome: Ongoing  Left shoulder bruised, right ankle abrasion ( wife says from his toenails) present. mepilex applied to ankle. Problem: Pain:  Goal: Control of acute pain  Control of acute pain   Outcome: Ongoing  Medicated for headache, as ordered.

## 2017-12-06 NOTE — PROGRESS NOTES
Smoking Cessation - topics covered   []  Health Risks  []  Benefits of Quitting   []  Smoking Cessation  [x]  Patient has no history of tobacco use  []  Patient is former smoker. [x]  No need for tobacco cessation education. []  Booklet given  []  Patient verbalizes understanding. []  Patient denies need for tobacco cessation education.   Marva Koo  8:36 AM

## 2017-12-06 NOTE — PROGRESS NOTES
Physical Therapy    Facility/Department: [facility] CAR 1  Initial Assessment    NAME: Caryle Murdoch  : 1950  MRN: [id number]    Copied from cardiology note filed 17 at 7:14am:  CHIEF COMPLAINT:  Symptomatic bradycardia       Date of Service: 2017    Patient Diagnosis(es): There were no encounter diagnoses. has a past medical history of A-fib ([address] Acquired hypothyroidism; Anemia; Ascending aortic aneurysm (HCC); CAD (coronary artery disease); CHF (congestive heart failure) ([address] Diabetes mellitus ([address] Hyperlipidemia; Hypertension; Movement disorder; Multiple sclerosis ([address] Neurogenic bladder; Neuromuscular disorder ([address] Other disorders of kidney and ureter in diseases classified elsewhere; Pneumonia; Rib fractures; and Type 1 dissection of ascending aorta ([facility] has a past surgical history that includes Abdominal aortic aneurysm repair (12); Appendectomy; Achilles tendon surgery; and TURP. Restrictions  Restrictions/Precautions  Restrictions/Precautions: General Precautions, Fall Risk  Required Braces or Orthoses?: No  Position Activity Restriction  Other position/activity restrictions: bedrest with bedside commode,   Vision/Hearing        Subjective  General  Patient assessed for rehabilitation services?: Yes  Response To Previous Treatment: Not applicable  Family / Caregiver Present: No  Follows Commands: Within Functional Limits  General Comment  Comments: OT co-eval  Subjective  Subjective: RN and pt agreeable to PT.  Pt alert in bed upon arrival.  Pain Screening  Patient Currently in Pain: Yes (headache, frontal, middle of forehead.)  Vital Signs  Patient Currently in Pain: Denies  Pre Treatment Pain Screening  Intervention List: Patient able to continue with treatment    Orientation  Orientation  Overall Orientation Status: Within Functional Limits    Social/Functional History  Social/Functional History  Lives With: Spouse  Type of Home: House  Home Layout: Two level, Able to Live on Main level with bedroom/bathroom  Home Access: Stairs to enter with rails, Level entry (Pt reports level entry into back of home with sliding door)  Entrance Stairs - Number of Steps: 4 FRIDA   Entrance Stairs - Rails: Left  Bathroom Shower/Tub: Tub/Shower unit  Bathroom Toilet: Standard  Bathroom Equipment: Shower chair, Grab bars in shower, Grab bars around toilet  Bathroom Accessibility: Accessible  Home Equipment: Rolling walker, 4 wheeled walker  ADL Assistance: [facility] [address] [facility] [address]: [address] [place] [address] Responsibilities: Yes (pt reports wife completes cleaning )  Meal Prep Responsibility: Secondary  Laundry Responsibility: Secondary  Shopping Responsibility: Secondary  Ambulation Assistance: Independent (with use of walker)  Transfer Assistance: Independent  Active : No  Patient's  Info: wife drives   Mode of Transportation: Car  Occupation: Retired  Type of occupation:  on the [employer]: [facility]  Strength LLE  Strength LLE: WFL  Strength RUE  Strength RUE: WFL  Strength LUE  Strength LUE: WFL        Bed mobility  Supine to Sit: Minimal assistance  Sit to Supine:  (left in chair)  Scooting: Contact guard assistance  Transfers  Sit to Stand: Minimal Assistance  Stand to sit: Minimal Assistance  Ambulation  Ambulation?: Yes  More Ambulation?: No  Ambulation 1  Surface: level tile  Device: Rolling Walker  Assistance: Minimal assistance  Quality of Gait: unsteady, slowed yanique,   Distance: 4' to chair  Stairs/Curb  Stairs?: No     Balance  Posture: Fair  Sitting - Static: Good  Sitting - Dynamic: Fair;+  Standing - Static: Fair;+  Standing - Dynamic: Fair  Exercises  Hip Flexion: 15x Bilat  Knee Long Arc Quad: 15x Bilat  Ankle Pumps: 15x Bilat     Assessment   Body structures, Functions, Activity limitations: Decreased functional mobility ; Decreased strength;Decreased endurance  Assessment: amb 4' RW to chair.  Pt limited by faatigue, endurnace. Prognosis: Good  Decision Making: Medium Complexity  Patient Education: PT POC  REQUIRES PT FOLLOW UP: Yes  Activity Tolerance  Activity Tolerance: Patient Tolerated treatment well;Patient limited by fatigue;Patient limited by endurance     Plan   Plan  Times per week: 3-5x/wk  (until addn'l mobility permitted)  Current Treatment Recommendations: Strengthening, Endurance Training, Transfer Training, Balance Training, Gait Training, Functional Mobility Training, Stair training  Safety Devices  Type of devices: Left in chair, Nurse notified, Eddye Curls in use, Chair alarm in place, Call light within reach, Patient at risk for falls, Gait belt    G-Code  PT G-Codes  Functional Assessment Tool Used: [place] tool  Score: 15  Functional Limitation: Mobility: Walking and moving around  Mobility: Walking and Moving Around Current Status (): At least 40 percent but less than 60 percent impaired, limited or restricted  Mobility: Walking and Moving Around Goal Status ():  At least 20 percent but less than 40 percent impaired, limited or restricted    Goals  Short term goals  Time Frame for Short term goals: 14 visits  Short term goal 1: Pt will be I with bed mobility  Short term goal 2: Pt will be I with transfers  Short term goal 3: Pt will be I with amb 300'   Short term goal 4: Pt will navigate 5steps Gabe with L rail       Therapy Time   Individual Concurrent Group Co-treatment   Time In 0835         Time Out 0914         Minutes [address], PT

## 2017-12-06 NOTE — CONSULTS
caths (non compliant)    Neuromuscular disorder (HCC)     Other disorders of kidney and ureter in diseases classified elsewhere     Pneumonia     Rib fractures 10/08/12    8th and 9th rib    Type 1 dissection of ascending aorta (HCC)        Past Surgical History:        Procedure Laterality Date    ABDOMINAL AORTIC ANEURYSM REPAIR  8/9/12    emergent repair of type A dissection    ACHILLES TENDON SURGERY      APPENDECTOMY      TURP         Current Medications:      ciprofloxacin (CIPRO) tablet 500 mg BID   sodium chloride flush 0.9 % injection 10 mL 2 times per day   sodium chloride flush 0.9 % injection 10 mL PRN   acetaminophen (TYLENOL) tablet 650 mg Q4H PRN   magnesium hydroxide (MILK OF MAGNESIA) 400 MG/5ML suspension 30 mL Daily PRN   ondansetron (ZOFRAN) injection 4 mg Q6H PRN   potassium chloride (KLOR-CON M) extended release tablet 40 mEq PRN   Or    potassium chloride 20 MEQ/15ML (10%) oral solution 40 mEq PRN   Or    potassium chloride 10 mEq/100 mL IVPB (Peripheral Line) PRN   magnesium sulfate 1 g in dextrose 5% 100 mL IVPB PRN   hydrALAZINE (APRESOLINE) injection 10 mg Q4H PRN   DOPamine (INTROPIN) 400 mg in dextrose 5 % 250 mL infusion Continuous   aspirin chewable tablet 81 mg Daily   glucose (GLUTOSE) 40 % oral gel 15 g PRN   dextrose 50 % solution 12.5 g PRN   glucagon (rDNA) injection 1 mg PRN   dextrose 5 % solution PRN   insulin lispro (HUMALOG) injection vial 0-6 Units TID WC   insulin lispro (HUMALOG) injection vial 0-3 Units Nightly   atorvastatin (LIPITOR) tablet 20 mg Nightly   0.9 % sodium chloride infusion Continuous       Allergies:  Review of patient's allergies indicates no known allergies.     Social History:   Social History     Social History    Marital status:      Spouse name: N/A    Number of children: N/A    Years of education: N/A     Occupational History    retired [employer]       Social History Main Topics    Smoking status: Never Smoker    Smokeless tobacco: Never Used    Alcohol use No    Drug use: No    Sexual activity: Not on file     Other Topics Concern    Not on file     Social History Narrative    No narrative on file       Family History:   Family History   Problem Relation Age of Onset   Norah Levy Cancer Father      leukemia    Diabetes Mother     Hypertension Mother    Norah Levy Diabetes Sister     Hypertension Sister     Diabetes Brother        Review of Systems:    Constitutional: No fever, no chills, no lethargy, no weakness. HEENT:  No headache, otalgia, itchy eyes, nasal discharge or sore throat. Cardiac:  No chest pain, dyspnea, orthopnea or PND. Chest:              No cough, phlegm or wheezing. Abdomen:  No abdominal pain, nausea or vomiting. Neuro:  History of movement disorder with multiple sclerosis. Skin:   No rashes, no itching. :   No hematuria, no pyuria, no dysuria, no flank pain. History of neurogenic bladder in the setting of multiple sclerosis, with the patient having difficulty with compliance with self catheterization in the past and a history of BPH with previous TURP. Extremities:  No swelling or joint pains.       Objective:  CURRENT TEMPERATURE:  Temp: 98.4 °F (36.9 °C)  MAXIMUM TEMPERATURE OVER 24HRS:  Temp (24hrs), Av.1 °F (36.7 °C), Min:97.7 °F (36.5 °C), Max:98.7 °F (37.1 °C)    CURRENT RESPIRATORY RATE:  Resp: 16  CURRENT PULSE:  Pulse: 56  CURRENT BLOOD PRESSURE:  BP: (!) 119/103  24HR BLOOD PRESSURE RANGE:  Systolic (64FMT), SDV:711 , Min:103 , EXP:245   ; Diastolic (30NPE), ZOJ:35, Min:43, Max:121    24HR INTAKE/OUTPUT:    Intake/Output Summary (Last 24 hours) at 17 1215  Last data filed at 17 1200   Gross per 24 hour   Intake              975 ml   Output             3750 ml   Net            -2775 ml     Patient Vitals for the past 96 hrs (Last 3 readings):   Weight   17 0501 237 lb (107.5 kg)   17 1347 233 lb 14.4 oz (106.1 kg)     Physical Exam:  General appearance:Awake,

## 2017-12-06 NOTE — PROGRESS NOTES
Occupational Therapy   Occupational Therapy Initial Assessment  Date: 2017   Patient Name: Orly Gibbs  MRN: [id number]     : 1950     Copied from cardiology note filed 17 at 7:14am:  [address] bradycardia      Patient Diagnosis(es): There were no encounter diagnoses. has a past medical history of A-fib ([facility] Acquired hypothyroidism; Anemia; Ascending aortic aneurysm (HCC); CAD (coronary artery disease); CHF (congestive heart failure) ([facility] Diabetes mellitus ([facility] Hyperlipidemia; Hypertension; Movement disorder; Multiple sclerosis ([facility] [address] Neurogenic bladder; Neuromuscular disorder ([facility] [address] [facility] Other disorders of kidney and ureter in diseases classified elsewhere; Pneumonia; Rib fractures; and Type 1 dissection of ascending aorta ([facility] [address] [facility] has a past surgical history that includes Abdominal aortic aneurysm repair (12); Appendectomy; Achilles tendon surgery; and TURP.     Restrictions  Restrictions/Precautions  Restrictions/Precautions: General Precautions, Fall Risk (per VOLODYMYR Sanders, pt fell in bathroom last night, hit head)  Required Braces or Orthoses?: No  Position Activity Restriction  Other position/activity restrictions: bedrest with bedside commode,     Subjective   General  Patient assessed for rehabilitation services?: Yes  Family / Caregiver Present: No  Pain Assessment  Patient Currently in Pain: Yes (headache, frontal, middle of forehead.)  Pain Assessment: 0-10  Pain Level: 3  Pain Type: Acute pain  Pain Location: Head  Pain Orientation: Anterior, Mid  Oxygen Therapy  SpO2: 99 %  Pulse Oximeter Device Mode: Continuous  O2 Device: None (Room air)     Social/Functional History  Social/Functional History  Lives With: Spouse  Type of Home: House  Home Layout: Two level, Able to Live on Main level with bedroom/bathroom  Home Access: Stairs to enter with rails, Level entry (Pt reports level entry into back of home with sliding door)  Entrance Stairs - Number of Steps: demo good safety awareness during transfers. Pt performed stand > sit transfer into chair. Pt demo don/ doffing sock. Pt used urinal and completed pericare and gown change. Pt remained in chair, call light in reach and RN notified on therapist exit. Chair alarm armed. Tone RUE  RUE Tone: Normotonic  Tone LUE  LUE Tone: Normotonic  Coordination  Movements Are Fluid And Coordinated: Yes     Bed mobility  Supine to Sit: Minimal assistance  Sit to Supine:  (left in chair)  Scooting: Contact guard assistance  Transfers  Stand Step Transfers: Contact guard assistance  Sit to stand: Minimal assistance  Stand to sit: Minimal assistance  Transfer Comments: use of RW      Cognition  Overall Cognitive Status: WFL  Perception  Overall Perceptual Status: WFL     Sensation  Overall Sensation Status: WFL      LUE AROM (degrees)  LUE AROM : WFL  RUE AROM (degrees)  RUE AROM : WFL  LUE Strength  Gross LUE Strength: WFL  RUE Strength  Gross RUE Strength: WFL    Assessment   Performance deficits / Impairments: Decreased functional mobility ; Decreased endurance;Decreased ADL status; Decreased high-level IADLs  Prognosis: Good  Decision Making: Medium Complexity  Patient Education: OT POC, safety, proper pursed lipped breathing  REQUIRES OT FOLLOW UP: Yes  Activity Tolerance  Activity Tolerance: Patient Tolerated treatment well;Patient limited by fatigue  Activity Tolerance: decreased endurance  Safety Devices  Safety Devices in place: Yes  Type of devices: Left in chair;Nurse notified;Call light within reach;Gait belt;Patient at risk for falls (per RN Rebecca Flood, pt fell last night )  Restraints  Initially in place: No  OT Equipment Recommendations  Equipment Needed: No        Plan   Plan  Times per week: 4-5x/wk   Current Treatment Recommendations: Functional Mobility Training, Endurance Training, Safety Education & Training, Self-Care / ADL, Patient/Caregiver Education & Training, Equipment Evaluation, Education, & procurement, Home Management Training    G-Code  OT G-codes  Functional Assessment Tool Used: Barthel Index   Score: 18/20  Functional Limitation: Self care  Self Care Current Status (): At least 1 percent but less than 20 percent impaired, limited or restricted  Self Care Goal Status (): 0 percent impaired, limited or restricted    Goals  Short term goals  Time Frame for Short term goals: by discharge, pt will  Short term goal 1: demo I in UE ADL activites   Short term goal 2: demo I in LE ADL activites  Short term goal 3: demo understanding and I use of EC/WS, fall prevention and proper pursed lipped breathing tech during functional activities   Short term goal 4: demo increased activity tolerance of 30+ min to assist with ADL/ functional activites   Patient Goals   Patient goals : to go home     Therapy Time   Individual Concurrent Group Co-treatment   Time In [phone]         Time Out 0914         Minutes 39          See above for LOF. RN reports patient is medically stable for therapy treatment this date. Chart reviewed prior to treatment and patient is agreeable for therapy. All lines intact and patient positioned comfortably at end of treatment. All patient needs addressed prior to ending therapy session.        Co-evaluation with PT  Jaleel Rubio OTR/L

## 2017-12-06 NOTE — CONSULTS
[facility] Cardiology Cardiology    Consult              Today's Date: 12/6/2017  Patient Name: Deedee Flaherty  Date of admission: 12/5/2017  1:44 PM  Patient's age: 75 y. o., 1950  Admission Dx: Cardiac related syncope [R55]    Reason for  Consult:  Bradycardia    Requesting Physician: Carlos Ruiz MD    CHIEF COMPLAINT:  Fatigue and somnolence    History Obtained From:  patient    HISTORY OF PRESENT ILLNESS:      The patient is a 75 y.o.  male who is admitted to the hospital for symptomatic extreme bradycardia with HR is the 30th. He was recently diagnosed with AF and was started on Coreg and Amiodarone for newly diagnosed AF along with Xarelto. He comes in with extreme bradycardia and was reported by family to be somnolent and difficult to arounse at times. He developed SOURAV likely related to his low cardiac output. He has been on dompaine infusion since admission and all relevant medications were stopped. His thyroid function is also abnormal with elevated TSH, although not extremely elevated. ECHO 8/20/17: EF 55-60%, trace AR, mild MR      Past Medical History:   has a past medical history of A-fib ([address] Acquired hypothyroidism; Anemia; Ascending aortic aneurysm (HCC); CAD (coronary artery disease); CHF (congestive heart failure) ([address] Diabetes mellitus ([address] Hyperlipidemia; Hypertension; Movement disorder; Multiple sclerosis ([address] Neurogenic bladder; Neuromuscular disorder ([address] Other disorders of kidney and ureter in diseases classified elsewhere; Pneumonia; Rib fractures; and Type 1 dissection of ascending aorta ([address] Past Surgical History:   has a past surgical history that includes Abdominal aortic aneurysm repair (8/9/12); Appendectomy; Achilles tendon surgery; and TURP. Home Medications:    Prior to Admission medications    Medication Sig Start Date End Date Taking?  Authorizing Provider   amiodarone (CORDARONE) 200 MG tablet Take 200 mg by mouth 2 times daily   Yes Historical Provider, MD   ciprofloxacin (CIPRO) 500 MG tablet Take 500 mg by mouth 2 times daily   Yes Historical Provider, MD   tolterodine (DETROL LA) 4 MG extended release capsule Take 4 mg by mouth daily   Yes Historical Provider, MD   sulfamethoxazole-trimethoprim (BACTRIM;SEPTRA) 400-80 MG per tablet Take 1 tablet by mouth daily   Yes Historical Provider, MD   rivaroxaban (XARELTO) 20 MG TABS tablet Take 20 mg by mouth   Yes Historical Provider, MD   atorvastatin (LIPITOR) 20 MG tablet Take 20 mg by mouth daily   Yes Historical Provider, MD   carvedilol (COREG) 6.25 MG tablet Take 2 tablets by mouth 2 times daily (with meals). 11/13/14  Yes Claudeen Birchwood, MD   furosemide (LASIX) 40 MG tablet Take 1 tablet by mouth daily. Patient taking differently: Take 20 mg by mouth daily  7/13/14  Yes Chyna Marquez NP   lisinopril (PRINIVIL;ZESTRIL) 20 MG tablet Take 20 mg by mouth 2 times daily. Yes Historical Provider, MD   losartan (COZAAR) 100 MG tablet Take 100 mg by mouth daily    Yes Historical Provider, MD   cloNIDine (CATAPRES) 0.1 MG tablet Take 0.2 mg by mouth 2 times daily    Yes Historical Provider, MD   hydrALAZINE (APRESOLINE) 25 MG tablet Take 50 mg by mouth 2 times daily    Yes Historical Provider, MD   Timolol Maleate 0.5 % (DAILY) SOLN Apply 1 drop to eye daily    Yes Historical Provider, MD   metformin (GLUCOPHAGE) 500 MG tablet Take 500 mg by mouth daily (with breakfast). Yes Historical Provider, MD   aspirin 325 MG tablet Take 325 mg by mouth daily.      Yes Historical Provider, MD        Current Facility-Administered Medications: ciprofloxacin (CIPRO) tablet 500 mg, 500 mg, Oral, BID  sodium chloride flush 0.9 % injection 10 mL, 10 mL, Intravenous, 2 times per day  sodium chloride flush 0.9 % injection 10 mL, 10 mL, Intravenous, PRN  acetaminophen (TYLENOL) tablet 650 mg, 650 mg, Oral, Q4H PRN  magnesium hydroxide (MILK OF MAGNESIA) 400 MG/5ML suspension 30 mL, 30 mL, Oral, Daily sinus node dysfunction exaggerated by his current meds. Also noted to have new hypothyroidism, although these changes might also be exacerbated with use of amiodarone since free T4 is within normal range. 2.  Now requiring dopamine for keeping HR up    RECOMMENDATIONS:  1. Continue to hold medication   2. Consult Endocrine  3. If bradycardia persist beyond 2.5 days, it is less likely to improve further and amiodarone would take very long to clear out, likewise improvement of thyroid would takes weeks permanent pacing is justified in this situation, since he will require medication to control his HR when in AF. Discussed with patient and family and Nurse.         Wendy Lr MD MSc. [facility]  Cardiology/Electrophysiology  [facility]  [phone]

## 2017-12-07 ENCOUNTER — APPOINTMENT (OUTPATIENT)
Dept: CARDIAC CATH/INVASIVE PROCEDURES | Age: 67
DRG: 242 | End: 2017-12-07
Attending: INTERNAL MEDICINE
Payer: MEDICARE

## 2017-12-07 ENCOUNTER — APPOINTMENT (OUTPATIENT)
Dept: GENERAL RADIOLOGY | Age: 67
DRG: 242 | End: 2017-12-07
Attending: INTERNAL MEDICINE
Payer: MEDICARE

## 2017-12-07 LAB
ANION GAP SERPL CALCULATED.3IONS-SCNC: 14 MMOL/L (ref 9–17)
ANTI-NUCLEAR ANTIBODY (ANA): NEGATIVE
BUN BLDV-MCNC: 37 MG/DL (ref 8–23)
BUN/CREAT BLD: ABNORMAL (ref 9–20)
CALCIUM SERPL-MCNC: 8.7 MG/DL (ref 8.6–10.4)
CHLORIDE BLD-SCNC: 102 MMOL/L (ref 98–107)
CO2: 22 MMOL/L (ref 20–31)
CREAT SERPL-MCNC: 1.79 MG/DL (ref 0.7–1.2)
GFR AFRICAN AMERICAN: 46 ML/MIN
GFR NON-AFRICAN AMERICAN: 38 ML/MIN
GFR SERPL CREATININE-BSD FRML MDRD: ABNORMAL ML/MIN/{1.73_M2}
GFR SERPL CREATININE-BSD FRML MDRD: ABNORMAL ML/MIN/{1.73_M2}
GLUCOSE BLD-MCNC: 124 MG/DL (ref 75–110)
GLUCOSE BLD-MCNC: 139 MG/DL (ref 70–99)
GLUCOSE BLD-MCNC: 176 MG/DL (ref 75–110)
PARTIAL THROMBOPLASTIN TIME: 26.1 SEC (ref 21.3–31.3)
PARTIAL THROMBOPLASTIN TIME: 35.7 SEC (ref 21.3–31.3)
POTASSIUM SERPL-SCNC: 4.2 MMOL/L (ref 3.7–5.3)
SODIUM BLD-SCNC: 138 MMOL/L (ref 135–144)

## 2017-12-07 PROCEDURE — 82947 ASSAY GLUCOSE BLOOD QUANT: CPT

## 2017-12-07 PROCEDURE — 85730 THROMBOPLASTIN TIME PARTIAL: CPT

## 2017-12-07 PROCEDURE — 2060000000 HC ICU INTERMEDIATE R&B

## 2017-12-07 PROCEDURE — 6360000002 HC RX W HCPCS

## 2017-12-07 PROCEDURE — 2580000003 HC RX 258: Performed by: INTERNAL MEDICINE

## 2017-12-07 PROCEDURE — 02H63JZ INSERTION OF PACEMAKER LEAD INTO RIGHT ATRIUM, PERCUTANEOUS APPROACH: ICD-10-PCS | Performed by: INTERNAL MEDICINE

## 2017-12-07 PROCEDURE — C1898 LEAD, PMKR, OTHER THAN TRANS: HCPCS

## 2017-12-07 PROCEDURE — 80048 BASIC METABOLIC PNL TOTAL CA: CPT

## 2017-12-07 PROCEDURE — 97535 SELF CARE MNGMENT TRAINING: CPT

## 2017-12-07 PROCEDURE — 0JH606Z INSERTION OF PACEMAKER, DUAL CHAMBER INTO CHEST SUBCUTANEOUS TISSUE AND FASCIA, OPEN APPROACH: ICD-10-PCS | Performed by: INTERNAL MEDICINE

## 2017-12-07 PROCEDURE — 2580000003 HC RX 258

## 2017-12-07 PROCEDURE — 71010 XR CHEST LIMITED: CPT

## 2017-12-07 PROCEDURE — 99232 SBSQ HOSP IP/OBS MODERATE 35: CPT | Performed by: INTERNAL MEDICINE

## 2017-12-07 PROCEDURE — 36415 COLL VENOUS BLD VENIPUNCTURE: CPT

## 2017-12-07 PROCEDURE — 6370000000 HC RX 637 (ALT 250 FOR IP): Performed by: HOSPITALIST

## 2017-12-07 PROCEDURE — 6370000000 HC RX 637 (ALT 250 FOR IP): Performed by: INTERNAL MEDICINE

## 2017-12-07 PROCEDURE — 94762 N-INVAS EAR/PLS OXIMTRY CONT: CPT

## 2017-12-07 PROCEDURE — 02HK3JZ INSERTION OF PACEMAKER LEAD INTO RIGHT VENTRICLE, PERCUTANEOUS APPROACH: ICD-10-PCS | Performed by: INTERNAL MEDICINE

## 2017-12-07 PROCEDURE — C1894 INTRO/SHEATH, NON-LASER: HCPCS

## 2017-12-07 PROCEDURE — 6360000002 HC RX W HCPCS: Performed by: INTERNAL MEDICINE

## 2017-12-07 PROCEDURE — C1785 PMKR, DUAL, RATE-RESP: HCPCS

## 2017-12-07 PROCEDURE — 33208 INSRT HEART PM ATRIAL & VENT: CPT

## 2017-12-07 PROCEDURE — 97110 THERAPEUTIC EXERCISES: CPT

## 2017-12-07 PROCEDURE — 2500000003 HC RX 250 WO HCPCS

## 2017-12-07 RX ORDER — ACETAMINOPHEN 325 MG/1
650 TABLET ORAL EVERY 4 HOURS PRN
Status: DISCONTINUED | OUTPATIENT
Start: 2017-12-07 | End: 2017-12-07 | Stop reason: SDUPTHER

## 2017-12-07 RX ORDER — OXYCODONE HYDROCHLORIDE 5 MG/1
10 TABLET ORAL EVERY 4 HOURS PRN
Status: DISCONTINUED | OUTPATIENT
Start: 2017-12-07 | End: 2017-12-08 | Stop reason: HOSPADM

## 2017-12-07 RX ORDER — VANCOMYCIN HYDROCHLORIDE 1 G/200ML
1000 INJECTION, SOLUTION INTRAVENOUS ONCE
Status: COMPLETED | OUTPATIENT
Start: 2017-12-07 | End: 2017-12-07

## 2017-12-07 RX ORDER — ONDANSETRON 2 MG/ML
4 INJECTION INTRAMUSCULAR; INTRAVENOUS EVERY 6 HOURS PRN
Status: DISCONTINUED | OUTPATIENT
Start: 2017-12-07 | End: 2017-12-07 | Stop reason: SDUPTHER

## 2017-12-07 RX ORDER — SODIUM CHLORIDE 0.9 % (FLUSH) 0.9 %
10 SYRINGE (ML) INJECTION EVERY 12 HOURS SCHEDULED
Status: DISCONTINUED | OUTPATIENT
Start: 2017-12-07 | End: 2017-12-08 | Stop reason: HOSPADM

## 2017-12-07 RX ORDER — SODIUM CHLORIDE 0.9 % (FLUSH) 0.9 %
10 SYRINGE (ML) INJECTION PRN
Status: DISCONTINUED | OUTPATIENT
Start: 2017-12-07 | End: 2017-12-08 | Stop reason: HOSPADM

## 2017-12-07 RX ORDER — OXYCODONE HYDROCHLORIDE 5 MG/1
5 TABLET ORAL EVERY 4 HOURS PRN
Status: DISCONTINUED | OUTPATIENT
Start: 2017-12-07 | End: 2017-12-08 | Stop reason: HOSPADM

## 2017-12-07 RX ADMIN — CIPROFLOXACIN 500 MG: 500 TABLET, FILM COATED ORAL at 08:55

## 2017-12-07 RX ADMIN — VANCOMYCIN HYDROCHLORIDE 1000 MG: 1 INJECTION, SOLUTION INTRAVENOUS at 16:07

## 2017-12-07 RX ADMIN — CEFAZOLIN 1 G: 1 INJECTION, POWDER, FOR SOLUTION INTRAMUSCULAR; INTRAVENOUS at 16:08

## 2017-12-07 RX ADMIN — HEPARIN SODIUM AND DEXTROSE 1600 UNITS/HR: 10000; 5 INJECTION INTRAVENOUS at 07:48

## 2017-12-07 RX ADMIN — ASPIRIN 81 MG: 81 TABLET, CHEWABLE ORAL at 08:54

## 2017-12-07 RX ADMIN — ATORVASTATIN CALCIUM 20 MG: 20 TABLET, FILM COATED ORAL at 20:48

## 2017-12-07 RX ADMIN — LEVOTHYROXINE SODIUM 25 MCG: 25 TABLET ORAL at 08:55

## 2017-12-07 RX ADMIN — CIPROFLOXACIN 500 MG: 500 TABLET, FILM COATED ORAL at 20:48

## 2017-12-07 RX ADMIN — HEPARIN SODIUM 4000 UNITS: 1000 INJECTION, SOLUTION INTRAVENOUS; SUBCUTANEOUS at 07:46

## 2017-12-07 RX ADMIN — INSULIN LISPRO 1 UNITS: 100 INJECTION, SOLUTION INTRAVENOUS; SUBCUTANEOUS at 22:02

## 2017-12-07 ASSESSMENT — ENCOUNTER SYMPTOMS
COUGH: 0
HEMOPTYSIS: 0
NAUSEA: 0
SPUTUM PRODUCTION: 0
SHORTNESS OF BREATH: 0
WHEEZING: 0
VOMITING: 0

## 2017-12-07 ASSESSMENT — PAIN SCALES - GENERAL: PAINLEVEL_OUTOF10: 0

## 2017-12-07 NOTE — PROGRESS NOTES
[facility]   Progress Note                   Date:   12/7/2017  Patient name: George Mckeon  Date of admission:  12/5/2017  1:44 PM  MRN:   [id number]  YOB: 1950  PCP: Payton Amador MD    Reason for Admission:  Marked sinus bradycardia     Subjective:       Remains stable with HR ranging 50-55 at rest on 2 mcg of dopamine infusion, BP normal   Denies any chest pain, dyspnea, orthopnea or light headedness   Cr improved to 1.79 this AM     Medications:   Scheduled Meds:   levothyroxine  25 mcg Oral Daily    ciprofloxacin  500 mg Oral BID    sodium chloride flush  10 mL Intravenous 2 times per day    aspirin  81 mg Oral Daily    insulin lispro  0-6 Units Subcutaneous TID WC    insulin lispro  0-3 Units Subcutaneous Nightly    atorvastatin  20 mg Oral Nightly       Continuous Infusions:   heparin (porcine) Stopped (12/07/17 0846)    DOPamine Stopped (12/07/17 0847)    dextrose      sodium chloride 100 mL/hr at 12/06/17 0033       CBC:   Recent Labs      12/05/17   1627   WBC  8.3   HGB  8.6*   PLT  120*     BMP:    Recent Labs      12/05/17   1627  12/06/17   0406  12/07/17   0420   NA  129*  132*  138   K  5.2  4.2  4.2   CL  94*  96*  102   CO2  16*  19*  22   BUN  73*  58*  37*   CREATININE  4.02*  2.95*  1.79*   GLUCOSE  127*  170*  139*         Objective:   Vitals: BP (!) 119/59   Pulse (!) 49   Temp 98.4 °F (36.9 °C) (Oral)   Resp 15   Ht 6' (1.829 m)   Wt 234 lb 2.1 oz (106.2 kg)   SpO2 93%   BMI 31.75 kg/m²     General appearance: awake, lethargic, oriented, in no apparent respiratory distress   HEENT: Head: Normocephalic, no lesions,  Neck: no JVD  Lungs: clear to auscultation bilaterally, minimal basilar rales, no wheezing   Heart: sinus bradycardia, S1, S2 normal, grade 3/6 ESM over precordium, no rub or gallop  Abdomen: soft, non-tender; bowel sounds normal  Extremities: No LE edema  Neurologic: Mental status: Alert, oriented. Motor and sensory not done. EKG 12/5/2017  Marked sinus bradycardia, LAD. LVH. Echocardiogram 8/20/2017            Assessment / Acute Cardiac Problems:   Symptomatic sinus bradycardia, persistent 2 days after stopping amiodarone and BB. Paroxysmal atrial fibrillation, CHADS-VASc:3  Acute renal failure, pre renal due to reduced cardiac output from severe bradycardia  Hypothyroidism (TSH ~ 16)  Hx of dissection of ascending aorta with anuerysm s/p repair in 2012   Hypertension  Multiple sclerosis  Diabetes mellitus   Neurogenic bladder       Plan of Treatment:   1. Plan off PPM implantation today  2. Hold IV Heparin 4 hours pre procedure   3. Wean off dopamine infusion    4. Continue synthroid   5. Renal parameters continue to improve, nephrology following  6. Will need neurology evaluation as OP for possible NPH (incident finding on CT head)  7.  Discussed with patient and RN         Riri Chappell MD  Fellow, [facility]   Pager - [phone]

## 2017-12-07 NOTE — PROCEDURES
course through the venous system was confirmed by their presence under fluoroscopy in the inferior vena. RV Lead Implant:   A peel away sheath was brought to the field and placed into the venous system via over the wire technique. The right ventricular lead was placed via this sheath into the right ventricle under fluoroscopy. The lead was attached via active fixation to the distal RV septum. Adequate sensing and threshold parameters were obtained. There was no evidence of diaphragmatic stimulation at 10 V output. The peel away sheath was removed and the lead collar was advanced to the pectoral muscle and sutured with non absorbable suture. Stability of the lead and the length of the lead's slack was assessed as optimal with fluoroscopy. RA Lead Implant:  A peel away sheath was brought to the field and placed into the venous system via over the wire technique. The right atrial lead was placed via this sheath into the right atrium. The lead was attached via active fixation to the high lateral right atrial free wall. Adequate sensing and threshold parameters were obtained. There was no evidence of diaphragmatic stimulation at 10 V output. The peel away sheath was removed and the lead collar was advanced to the pectoral muscle and sutured with non absorbable suture. Stability of the lead and the length of the lead's slack was assessed as optimal with fluoroscopy. Generator: The implanted leads were attached to the dual chamber PPM device using the setscrews. The pocket was irrigated with antibiotic solution. The pulse generator and leads were coiled and placed in the pocket. Fluoroscopy was used to verify the final placement of the pacemaker and leads. The pocket was closed using multiple layers of suture and a dry sterile dressing was applied. There were no complications, patient tolerated the procedure well. The patient left the EP lab in stable condition.           Impression / Device:  Successful Implantation of dual chamber PPM  via left axillary vein. Adequate sensing and pacing threshold. Device is MRI conditioned      Plan:  -- Telemetry monitoring overnight  -- Device Interrogation before discharge  -- Portable CXR now and PA/Lateral tomorrow before discharge  -- Wound check at [facility] in 7 days, device clinic in 6 months with remote interrogations every 3 months. -- Arm sling for next 24 hrs  -- Keflex 500 mg q8 hrs for 1 week  -- May start Xarelto tomorrow evening if no hematoma      Patient instructed to no showering or get the wound wet for 1 week, no driving for 1 week, no lifting more than 10 pounds for 4 weeks, no arm raising above the shoulder for 4 weeks. No lovenox or heparin at any dose is to be given.       Electronically signed by Elizabeth Nesbitt MD on 12/7/2017 at 3:55 PM  [facility]  [phone]

## 2017-12-07 NOTE — PLAN OF CARE
matter supratentorially. 4.  No significant interval change from prior examination of 4/21/2004.   Finalized by Debra Reaves MD on 8/18/2017 1:23 PM    CURRENT MEDS:     sodium chloride flush 0.9 % injection 10 mL 2 times per day   sodium chloride flush 0.9 % injection 10 mL PRN   oxyCODONE (ROXICODONE) immediate release tablet 5 mg Q4H PRN   Or    oxyCODONE (ROXICODONE) immediate release tablet 10 mg Q4H PRN   levothyroxine (SYNTHROID) tablet 25 mcg Daily   ciprofloxacin (CIPRO) tablet 500 mg BID   sodium chloride flush 0.9 % injection 10 mL 2 times per day   sodium chloride flush 0.9 % injection 10 mL PRN   acetaminophen (TYLENOL) tablet 650 mg Q4H PRN   magnesium hydroxide (MILK OF MAGNESIA) 400 MG/5ML suspension 30 mL Daily PRN   ondansetron (ZOFRAN) injection 4 mg Q6H PRN   hydrALAZINE (APRESOLINE) injection 10 mg Q4H PRN   aspirin chewable tablet 81 mg Daily   glucose (GLUTOSE) 40 % oral gel 15 g PRN   dextrose 50 % solution 12.5 g PRN   glucagon (rDNA) injection 1 mg PRN   dextrose 5 % solution PRN   insulin lispro (HUMALOG) injection vial 0-6 Units TID WC   insulin lispro (HUMALOG) injection vial 0-3 Units Nightly   atorvastatin (LIPITOR) tablet 20 mg Nightly   0.9 % sodium chloride infusion Continuous       VITALS:  BP (!) 157/74   Pulse 60   Temp 97.9 °F (36.6 °C) (Oral)   Resp 13   Ht 6' (1.829 m)   Wt 234 lb 2.1 oz (106.2 kg)   SpO2 98%   BMI 31.75 kg/m²     LABS:   Hematology:  Recent Labs      12/05/17   1627   WBC  8.3   HGB  8.6*   HCT  29.3*   PLT  120*     Chemistry:  Recent Labs      12/05/17   1627  12/06/17   0406  12/07/17   0420   NA  129*  132*  138   K  5.2  4.2  4.2   CL  94*  96*  102   CO2  16*  19*  22   GLUCOSE  127*  170*  139*   BUN  73*  58*  37*   CREATININE  4.02*  2.95*  1.79*   MG  2.3  2.2   --    CALCIUM  8.6  8.9  8.7   PHOS   --   3.8   --      Recent Labs      12/05/17   1627   TSH  16.70*       PROBLEMS:  Principal Problem:    Bradycardia  Active Problems:    Type 1 dissection of ascending aorta (HCC)    Hypertension    Multiple sclerosis ([facility]    Diabetes mellitus ([facility]    Neurogenic bladder    Cardiac related syncope    Acute kidney injury ([facility]    Acquired hypothyroidism    History of chronic urinary tract infection - on Cipro      UPDATED PLAN:  See current orders  Cardiology evaluation  Nephrology evaluation  Will monitor and control Diabetes  Will monitor and control Blood Pressure  Check reverse T3 /  thyroid replacement    Outpatient neurology evaluation as scheduled at [facility]    IP CONSULT TO INTERNAL MEDICINE  IP CONSULT TO DO Nessa  12/7/2017  6:21 PM

## 2017-12-07 NOTE — PROGRESS NOTES
Physical Therapy  DATE: 2017  NAME: George Mckeon  MRN: [id number]   : 1950    Subjective: Pt's in bed upon arrival. Per RN pt's HR low 40's bed ex's only. Pt is scheduled for a PPM this afternoon. Pain: Pt has no c/o pain  Patient follows: All commands  Is patient on ventilator: No  Is patient on sedation: No  Precautions: General Precautions, Fall Risk, PPM    Therapeutic exercises:  AROM all planes x 10 reps BUE and BLE. Goals  Short Term Goals  Short term goal 1: Pt will be I with bed mobility  Short term goal 2: Pt will be I with transfers  Short term goal 3: Pt will be I with amb 300'   Short term goal 4: Pt will navigate 5steps Gabe with L rail       Plan: Progress functional mobility as medically appropriate.    Time In: 900  Time Out: 920  Time Coded Minutes (treatment minutes): 20  Discharge Recommendation: CTA  Rehab Potential: Good  Treatments/week: 3-5x/wk    Sussy Johns PTA

## 2017-12-07 NOTE — PROGRESS NOTES
 levothyroxine  25 mcg Oral Daily    ciprofloxacin  500 mg Oral BID    sodium chloride flush  10 mL Intravenous 2 times per day    aspirin  81 mg Oral Daily    insulin lispro  0-6 Units Subcutaneous TID WC    insulin lispro  0-3 Units Subcutaneous Nightly    atorvastatin  20 mg Oral Nightly     Continuous Infusions:    heparin (porcine) Stopped (17)    DOPamine Stopped (17)    dextrose      sodium chloride 100 mL/hr at 17 0033     PRN Meds: heparin (porcine), heparin (porcine), sodium chloride flush, acetaminophen, magnesium hydroxide, ondansetron, hydrALAZINE, glucose, dextrose, glucagon (rDNA), dextrose      Review of Systems:     Review of Systems   Constitutional: Negative for chills and fever. Respiratory: Negative for cough, hemoptysis, sputum production, shortness of breath and wheezing. Cardiovascular: Negative for chest pain and palpitations. Gastrointestinal: Negative for nausea and vomiting. Genitourinary: Negative for flank pain and hematuria. Has had hesitancy   Musculoskeletal: Negative for joint pain and myalgias. Neurological: Positive for focal weakness ( lower extremities) and weakness. There has been ambulatory dysfunction  He uses a walker at home         Physical Examination:        Vitals:  /72   Pulse (!) 47   Temp 98.4 °F (36.9 °C) (Oral)   Resp 14   Ht 6' (1.829 m)   Wt 234 lb 2.1 oz (106.2 kg)   SpO2 93%   BMI 31.75 kg/m²   Temp (24hrs), Av.6 °F (37 °C), Min:98.4 °F (36.9 °C), Max:98.7 °F (37.1 °C)    Recent Labs      17   0650  17   1130  17   1646  17   2038   POCGLU  159*  162*  173*  100       Physical Exam   Constitutional: He is oriented to person, place, and time. No distress. HENT:   Head: Normocephalic and atraumatic. Eyes: Conjunctivae are normal. No scleral icterus. Neck: Neck supple. No JVD present. Cardiovascular: Normal rate.     Telemetry reveals sinus bradycardia of either kidney. CT brain  Impression:        The ventricles are dilated out of proportion to the sulci.  Normal pressure  hydrocephalus cannot be excluded.  Chronic small vessel ischemic changes.  No  acute brain parenchymal abnormality.          Assessment:        Primary Problem  Principal Problem:    Bradycardia  Active Problems:    Type 1 dissection of ascending aorta (HCC)    Hypertension    Multiple sclerosis (HCC)    Diabetes mellitus ([facility]    Neurogenic bladder    Cardiac related syncope    Acute kidney injury ([facility] [address]    Acquired hypothyroidism    History of chronic urinary tract infection - on Cipro      Plan:        On-call for permanent pacemaker  Cardiology evaluation  Nephrology evaluation  Will monitor and control Diabetes  Will monitor and control Blood Pressure  Check reverse T3 / anticipate thyroid replacement    Outpatient neurology evaluation to rule out normal pressure hydrocephalus    Will follow   Thanks    IP CONSULT TO INTERNAL MEDICINE  IP CONSULT TO DO Nessa  12/7/2017  3:13 PM

## 2017-12-07 NOTE — PROGRESS NOTES
Dr Faye Gonzalez rounded at bedside. Heparin and dopamine both stopped at this time. Plan for PPM today.

## 2017-12-08 ENCOUNTER — APPOINTMENT (OUTPATIENT)
Dept: GENERAL RADIOLOGY | Age: 67
DRG: 242 | End: 2017-12-08
Attending: INTERNAL MEDICINE
Payer: MEDICARE

## 2017-12-08 VITALS
HEIGHT: 72 IN | SYSTOLIC BLOOD PRESSURE: 143 MMHG | WEIGHT: 232.81 LBS | BODY MASS INDEX: 31.53 KG/M2 | OXYGEN SATURATION: 99 % | HEART RATE: 62 BPM | TEMPERATURE: 98.1 F | RESPIRATION RATE: 17 BRPM | DIASTOLIC BLOOD PRESSURE: 56 MMHG

## 2017-12-08 PROBLEM — E83.39 HYPOPHOSPHATEMIA: Status: ACTIVE | Noted: 2017-12-08

## 2017-12-08 LAB
ABSOLUTE EOS #: 0.12 K/UL (ref 0–0.44)
ABSOLUTE IMMATURE GRANULOCYTE: 0.04 K/UL (ref 0–0.3)
ABSOLUTE LYMPH #: 0.72 K/UL (ref 1.1–3.7)
ABSOLUTE MONO #: 0.72 K/UL (ref 0.1–1.2)
ANCA MYELOPEROXIDASE: 28 AU/ML
ANCA PROTEINASE 3: 14 AU/ML
ANION GAP SERPL CALCULATED.3IONS-SCNC: 13 MMOL/L (ref 9–17)
BASOPHILS # BLD: 0 % (ref 0–2)
BASOPHILS ABSOLUTE: <0.03 K/UL (ref 0–0.2)
BUN BLDV-MCNC: 27 MG/DL (ref 8–23)
BUN/CREAT BLD: ABNORMAL (ref 9–20)
CALCIUM SERPL-MCNC: 8.2 MG/DL (ref 8.6–10.4)
CHLORIDE BLD-SCNC: 107 MMOL/L (ref 98–107)
CO2: 21 MMOL/L (ref 20–31)
COMPLEMENT C3: 104 MG/DL (ref 90–180)
COMPLEMENT C4: 33 MG/DL (ref 10–40)
CREAT SERPL-MCNC: 1.41 MG/DL (ref 0.7–1.2)
DIFFERENTIAL TYPE: ABNORMAL
EOSINOPHILS RELATIVE PERCENT: 2 % (ref 1–4)
FREE KAPPA/LAMBDA RATIO: 1.45 (ref 0.26–1.65)
GFR AFRICAN AMERICAN: >60 ML/MIN
GFR NON-AFRICAN AMERICAN: 50 ML/MIN
GFR SERPL CREATININE-BSD FRML MDRD: ABNORMAL ML/MIN/{1.73_M2}
GFR SERPL CREATININE-BSD FRML MDRD: ABNORMAL ML/MIN/{1.73_M2}
GLUCOSE BLD-MCNC: 144 MG/DL (ref 75–110)
GLUCOSE BLD-MCNC: 150 MG/DL (ref 70–99)
GLUCOSE BLD-MCNC: 165 MG/DL (ref 75–110)
HCT VFR BLD CALC: 26.7 % (ref 40.7–50.3)
HEMOGLOBIN: 8.3 G/DL (ref 13–17)
IMMATURE GRANULOCYTES: 1 %
KAPPA FREE LIGHT CHAINS QNT: 2.5 MG/DL (ref 0.37–1.94)
LAMBDA FREE LIGHT CHAINS QNT: 1.73 MG/DL (ref 0.57–2.63)
LYMPHOCYTES # BLD: 11 % (ref 24–43)
MAGNESIUM: 1.9 MG/DL (ref 1.6–2.6)
MCH RBC QN AUTO: 26.9 PG (ref 25.2–33.5)
MCHC RBC AUTO-ENTMCNC: 31.1 G/DL (ref 28.4–34.8)
MCV RBC AUTO: 86.4 FL (ref 82.6–102.9)
MONOCYTES # BLD: 11 % (ref 3–12)
PARTIAL THROMBOPLASTIN TIME: 25.2 SEC (ref 21.3–31.3)
PARTIAL THROMBOPLASTIN TIME: 27.1 SEC (ref 21.3–31.3)
PDW BLD-RTO: 19.8 % (ref 11.8–14.4)
PHOSPHORUS: 2.3 MG/DL (ref 2.5–4.5)
PLATELET # BLD: 138 K/UL (ref 138–453)
PLATELET ESTIMATE: ABNORMAL
PMV BLD AUTO: 12 FL (ref 8.1–13.5)
POTASSIUM SERPL-SCNC: 4.4 MMOL/L (ref 3.7–5.3)
RBC # BLD: 3.09 M/UL (ref 4.21–5.77)
RBC # BLD: ABNORMAL 10*6/UL
SEG NEUTROPHILS: 76 % (ref 36–65)
SEGMENTED NEUTROPHILS ABSOLUTE COUNT: 5.13 K/UL (ref 1.5–8.1)
SODIUM BLD-SCNC: 141 MMOL/L (ref 135–144)
WBC # BLD: 6.8 K/UL (ref 3.5–11.3)
WBC # BLD: ABNORMAL 10*3/UL

## 2017-12-08 PROCEDURE — 83883 ASSAY NEPHELOMETRY NOT SPEC: CPT

## 2017-12-08 PROCEDURE — 83735 ASSAY OF MAGNESIUM: CPT

## 2017-12-08 PROCEDURE — 36415 COLL VENOUS BLD VENIPUNCTURE: CPT

## 2017-12-08 PROCEDURE — 85730 THROMBOPLASTIN TIME PARTIAL: CPT

## 2017-12-08 PROCEDURE — 6370000000 HC RX 637 (ALT 250 FOR IP): Performed by: NURSE PRACTITIONER

## 2017-12-08 PROCEDURE — 6360000002 HC RX W HCPCS: Performed by: HOSPITALIST

## 2017-12-08 PROCEDURE — 97116 GAIT TRAINING THERAPY: CPT

## 2017-12-08 PROCEDURE — 83516 IMMUNOASSAY NONANTIBODY: CPT

## 2017-12-08 PROCEDURE — 99232 SBSQ HOSP IP/OBS MODERATE 35: CPT | Performed by: INTERNAL MEDICINE

## 2017-12-08 PROCEDURE — 97110 THERAPEUTIC EXERCISES: CPT

## 2017-12-08 PROCEDURE — 6370000000 HC RX 637 (ALT 250 FOR IP): Performed by: INTERNAL MEDICINE

## 2017-12-08 PROCEDURE — 6370000000 HC RX 637 (ALT 250 FOR IP): Performed by: HOSPITALIST

## 2017-12-08 PROCEDURE — 80048 BASIC METABOLIC PNL TOTAL CA: CPT

## 2017-12-08 PROCEDURE — 82947 ASSAY GLUCOSE BLOOD QUANT: CPT

## 2017-12-08 PROCEDURE — 84100 ASSAY OF PHOSPHORUS: CPT

## 2017-12-08 PROCEDURE — 86038 ANTINUCLEAR ANTIBODIES: CPT

## 2017-12-08 PROCEDURE — 71020 XR CHEST STANDARD TWO VW: CPT

## 2017-12-08 PROCEDURE — 86160 COMPLEMENT ANTIGEN: CPT

## 2017-12-08 PROCEDURE — 85025 COMPLETE CBC W/AUTO DIFF WBC: CPT

## 2017-12-08 RX ORDER — AMIODARONE HYDROCHLORIDE 200 MG/1
200 TABLET ORAL DAILY
Status: DISCONTINUED | OUTPATIENT
Start: 2017-12-08 | End: 2017-12-08

## 2017-12-08 RX ORDER — CARVEDILOL 6.25 MG/1
6.25 TABLET ORAL 2 TIMES DAILY WITH MEALS
Status: DISCONTINUED | OUTPATIENT
Start: 2017-12-08 | End: 2017-12-08 | Stop reason: HOSPADM

## 2017-12-08 RX ORDER — CEPHALEXIN 500 MG/1
500 CAPSULE ORAL 3 TIMES DAILY
Qty: 21 CAPSULE | Refills: 0 | Status: SHIPPED | OUTPATIENT
Start: 2017-12-08 | End: 2017-12-15

## 2017-12-08 RX ORDER — FUROSEMIDE 40 MG/1
40 TABLET ORAL DAILY
Status: DISCONTINUED | OUTPATIENT
Start: 2017-12-08 | End: 2017-12-08 | Stop reason: HOSPADM

## 2017-12-08 RX ORDER — CARVEDILOL 6.25 MG/1
6.25 TABLET ORAL 2 TIMES DAILY WITH MEALS
Qty: 60 TABLET | Refills: 11 | Status: SHIPPED | OUTPATIENT
Start: 2017-12-08

## 2017-12-08 RX ORDER — LEVOTHYROXINE SODIUM 0.03 MG/1
25 TABLET ORAL DAILY
Qty: 30 TABLET | Refills: 11 | Status: SHIPPED | OUTPATIENT
Start: 2017-12-09

## 2017-12-08 RX ORDER — HYDRALAZINE HYDROCHLORIDE 50 MG/1
50 TABLET, FILM COATED ORAL EVERY 12 HOURS SCHEDULED
Status: DISCONTINUED | OUTPATIENT
Start: 2017-12-08 | End: 2017-12-08 | Stop reason: HOSPADM

## 2017-12-08 RX ORDER — ASPIRIN 81 MG/1
81 TABLET, CHEWABLE ORAL DAILY
Status: ON HOLD | COMMUNITY
End: 2018-04-11 | Stop reason: HOSPADM

## 2017-12-08 RX ORDER — LISINOPRIL 5 MG/1
5 TABLET ORAL DAILY
Status: DISCONTINUED | OUTPATIENT
Start: 2017-12-08 | End: 2017-12-08

## 2017-12-08 RX ADMIN — INSULIN LISPRO 1 UNITS: 100 INJECTION, SOLUTION INTRAVENOUS; SUBCUTANEOUS at 08:53

## 2017-12-08 RX ADMIN — HYDRALAZINE HYDROCHLORIDE 10 MG: 20 INJECTION INTRAMUSCULAR; INTRAVENOUS at 06:23

## 2017-12-08 RX ADMIN — CARVEDILOL 6.25 MG: 6.25 TABLET, FILM COATED ORAL at 13:18

## 2017-12-08 RX ADMIN — ASPIRIN 81 MG: 81 TABLET, CHEWABLE ORAL at 08:52

## 2017-12-08 RX ADMIN — FUROSEMIDE 40 MG: 40 TABLET ORAL at 13:18

## 2017-12-08 RX ADMIN — LISINOPRIL 5 MG: 5 TABLET ORAL at 13:18

## 2017-12-08 RX ADMIN — LEVOTHYROXINE SODIUM 25 MCG: 25 TABLET ORAL at 06:23

## 2017-12-08 RX ADMIN — CIPROFLOXACIN 500 MG: 500 TABLET, FILM COATED ORAL at 08:51

## 2017-12-08 RX ADMIN — INSULIN LISPRO 1 UNITS: 100 INJECTION, SOLUTION INTRAVENOUS; SUBCUTANEOUS at 13:21

## 2017-12-08 ASSESSMENT — PAIN SCALES - GENERAL
PAINLEVEL_OUTOF10: 0
PAINLEVEL_OUTOF10: 0

## 2017-12-08 ASSESSMENT — ENCOUNTER SYMPTOMS
HEMOPTYSIS: 0
WHEEZING: 0
COUGH: 0
SPUTUM PRODUCTION: 0
SHORTNESS OF BREATH: 0
NAUSEA: 0
VOMITING: 0

## 2017-12-08 NOTE — PROGRESS NOTES
Physical Therapy  Facility/Department: [facility] 3  Daily Treatment Note  NAME: Phoebe Curiel  : 1950  MRN: [id number]    Date of Service: 2017    Patient Diagnosis(es):   Patient Active Problem List    Diagnosis Date Noted    Acute kidney injury ([facility] [address] 2017    Acquired hypothyroidism 2017    History of chronic urinary tract infection - on Cipro 2017    Cardiac related syncope 2017    Bradycardia 2017    Dyspnea and respiratory abnormalities     Congestive heart failure ([address]     Neurogenic bladder 2014    Fever 2014    Hypotension 2014    Anemia, normocytic normochromic 2014    Hyperglycemia 2014    Multiple sclerosis (HCC)     Neuromuscular disorder (HCC)     Acute CHF (congestive heart failure) (HCC)     Diabetes mellitus (HCC)     Pneumonia     Type 1 dissection of ascending aorta ([facility] [address]     Hypertension     Rib fractures 10/08/2012    Ascending aortic aneurysm ([facility] [address] 2012       Past Medical History:   Diagnosis Date    A-fib [facility])     Acquired hypothyroidism 2017    Anemia 2014    Ascending aortic aneurysm ([address] 12    repair of AAA/emergent using 28mm Hemashield graft    CAD (coronary artery disease)     CH[facility]estive [facility] failure) [facility]     Diabetes mellitus ([address]     Hyperlipidemia     Hypertension     Movement disorder     Multiple sclerosis ([facility] [address] [facility]     Neurogenic bladder 2014    self caths (non compliant)    Neuromuscular disorder (HCC)     Other disorders of kidney and ureter in diseases classified elsewhere     Pneumonia     Rib fractures 10/08/12    8th and 9th rib    Type 1 dissection of ascending aorta [facility])      Past Surgical History:   Procedure Laterality Date    ABDOMINAL AORTIC ANEURYSM REPAIR  12    emergent repair of type A dissection    ACHILLES TENDON SURGERY      APPENDECTOMY      TURP Restrictions  Restrictions/Precautions  Restrictions/Precautions: Cardiac, General Precautions, Fall Risk (per RN Maru Subramanian, pt fell in bathroom last night, hit head)  Required Braces or Orthoses?: No  Implants present? :  Billie De Jesus pacemaker being placed this afternoon 12/7/17)  Position Activity Restriction  Other position/activity restrictions: PPM L UQ  Subjective    Pt sitting up in his chair upon arrival. Pt has a sitter. Pt has a PPM on his L UE side. Pt has no c/o pain   Orientation    Overall Orientation Status: Within Functional Limits  Objective     Transfers  Sit to Stand: CGA  Stand to sit: CGA  Comment: Verbal cues not to use L UE to push off. Pt is L hand dominate. RN to call  about sling to remind pt to limit it's use. Ambulation  Ambulation?: Yes  Ambulation 1  Surface: level tile  Device: Rolling Walker  Assistance: CGA  Quality of Gait: steady, slowed yanique, B LE's shaking,no LOB or buckling noted. Distance: 250'  Stairs/Curb  Stairs?: No     Balance  Posture: Fair  Sitting - Static: Good  Sitting - Dynamic: Good  Standing - Static: Fair;+  Standing - Dynamic: Fair  Exercises  Hip Flexion: 15x Bilat  Knee Long Arc Quad: 15x Bilat  Both with 2 lb wt on B LE's. Assessment     Body structures, Functions, Activity limitations: Decreased functional mobility ; Decreased strength;Decreased endurance  Assessment: amb 250' RW ,CGA. Pt limited by faatigue, endurnace.   Prognosis: Good  Decision Making: Medium Complexity  Patient Education: PT POC  REQUIRES PT FOLLOW UP: Yes  Activity Tolerance  Activity Tolerance: Patient Tolerated treatment well;Patient limited by fatigue;Patient limited by endurance     Goals  Short term goals  Time Frame for Short term goals: 14 visits  Short term goal 1: Pt will be I with bed mobility  Short term goal 2: Pt will be I with transfers  Short term goal 3: Pt will be I with amb 300'   Short term goal 4: Pt will navigate 5steps Gabe with L rail    Plan    Plan  Times per week: 3-5x/wk  (until addn'l mobility permitted)  Current Treatment Recommendations: Strengthening, Endurance Training, Transfer Training, Balance Training, Gait Training, Functional Mobility Training, Stair training  Safety Devices  Type of devices: Left in chair, Nurse notified, Tere Stock in use, Chair, Call light within reach, Patient at risk for falls, Gait belt     Therapy Time   Individual Concurrent Group Co-treatment   Time In  1015         Time Out  1045         Minutes  30                 [place], [place]

## 2017-12-08 NOTE — PROGRESS NOTES
Nephrology Progress Note      SUBJECTIVE       Pt was seen and examined. No acute issues overnite. Stable hemodynamics . OBJECTIVE      CURRENT TEMPERATURE:  Temp: 98.4 °F (36.9 °C)  MAXIMUM TEMPERATURE OVER 24HRS:  Temp (24hrs), Av.2 °F (36.8 °C), Min:97.9 °F (36.6 °C), Max:98.4 °F (36.9 °C)    CURRENT RESPIRATORY RATE:  Resp: 17  CURRENT PULSE:  Pulse: 63  CURRENT BLOOD PRESSURE:  BP: (!) 162/57 (10mg IVP Hydralazine given)  24HR BLOOD PRESSURE RANGE:  Systolic (76XPN), HHT:878 , Min:119 , AXH:367   ; Diastolic (05OUH), DSW:42, Min:57, Max:111    24HR INTAKE/OUTPUT:    Intake/Output Summary (Last 24 hours) at 17 0651  Last data filed at 17 0630   Gross per 24 hour   Intake             2155 ml   Output              650 ml   Net             1505 ml     WEIGHT :Patient Vitals for the past 96 hrs (Last 3 readings):   Weight   17 0623 232 lb 12.9 oz (105.6 kg)   17 0600 234 lb 2.1 oz (106.2 kg)   17 0501 237 lb (107.5 kg)     PHYSICAL EXAM      GENERAL APPEARANCE:Awake, alert, in no acute distress  SKIN: warm and dry, no rash or erythema  EYES: conjunctivae normal and sclera anicteric  ENT: no thrush no pharyngeal congestion   NECK:   No JVD. No carotid bruits and no carotid lymphadenopathy . PULMONARY: lungs are clear to auscultation. No Wheezing, no ronchi . CADRDIOVASCULAR: S1 and S2 normal NO S3 and NO S4 . No rubs , no murmur. ABDOMEN: soft nontender, bowel sounds present, no organomegaly, no ascites.      EXTREMITIES: no cyanosis, clubbing or edema     CURRENT MEDICATIONS        sodium chloride flush 0.9 % injection 10 mL 2 times per day   sodium chloride flush 0.9 % injection 10 mL PRN   oxyCODONE (ROXICODONE) immediate release tablet 5 mg Q4H PRN   Or    oxyCODONE (ROXICODONE) immediate release tablet 10 mg Q4H PRN   levothyroxine (SYNTHROID) tablet 25 mcg Daily   ciprofloxacin (CIPRO) tablet 500 mg BID   sodium chloride flush 0.9 % injection 10 mL 2 times per day C3 112 12/06/2017     C4:   Lab Results   Component Value Date    C4 33 12/06/2017     MPO ANCA: No results found for: MPO . PR3 ANCA:  No results found for: PR3  URINE SODIUM:    Lab Results   Component Value Date    PETE 66 12/06/2017      URINE CREATININE:  Lab Results   Component Value Date    LABCREA 53.4 12/06/2017     URINE EOSINOPHILS: No results found for: UREO  URINE PROTEIN:  No results found for: TPU  URINALYSIS:  U/A: Lab Results   Component Value Date    NITRU NEGATIVE 12/06/2017    COLORU YELLOW 12/06/2017    PHUR 5.0 12/06/2017    WBCUA 20 TO 50 12/06/2017    RBCUA 0 TO 2 12/06/2017    MUCUS NOT REPORTED 12/06/2017    TRICHOMONAS NOT REPORTED 12/06/2017    YEAST NOT REPORTED 12/06/2017    BACTERIA NOT REPORTED 12/06/2017    SPECGRAV 1.009 12/06/2017    LEUKOCYTESUR SMALL 12/06/2017    UROBILINOGEN Normal 12/06/2017    BILIRUBINUR NEGATIVE 12/06/2017    GLUCOSEU NEGATIVE 12/06/2017    KETUA NEGATIVE 12/06/2017    AMORPHOUS NOT REPORTED 12/06/2017     ANTIGBM:No results found for: GBMABIGG      RADIOLOGY      Reviewed as available. ASSESSMENT      Generalized fatigue with elevated kappa light chains  - doubt MM due to Scr improving - likely due to lack of renal perfusion due to bradycardia. - MM may be considered in the future due to above plus normocytic normochromatic anemia, and elevated Scr with basline of 0.79 in Dec 2016, patient is not hypercalcemic however and without back pain. - will also consider amyloidosis with cardiac and renal manifestations and MGRS  - RENZO, ANCA and complement levels are normal - not considering other types of vasculitis or autoimmune conditions. - unsure how hyponatremia could play a role in this - this is now resolved.     Bradycardia 2/2 afib or possibly SSS  - pacemaker placed this am  - care per cardio  - previous cardiac testing reports AMANDA 8/2012 EF 50%, afib, type A aortic dissection requiring surgical intervention    History of MS  - doubt NPH due to

## 2017-12-08 NOTE — DISCHARGE SUMMARY
[facility]  Discharge Note                 Name:  Phoebe Curiel  YOB: 1950  Social Security Number:  [id number]  Medical Record Number:  [id number]    Date of Admission:  12/5/2017  Date of Discharge:  12/8/2017    Admitting physician: Rosario Freeman MD    Discharge Attending: Farhad Smith CNP  Primary Care Physician: Jimenez Kelley MD  Consultants: Cardiology  Discharge to [address] [place] [address] LIST:  Patient Active Problem List   Diagnosis    Type 1 dissection of ascending aorta ([address]    Essential hypertension    Ascending aortic aneurysm ([address]    Multiple sclerosis ([facility]    Rib fractures    Neuromuscular disorder ([facility]    Acute CHF (congestive heart failure) ([facility] [facility]    Type 2 diabetes mellitus with diabetic nephropathy, without long-term current use of insulin (HCC)    Pneumonia    Neurogenic bladder    Fever    Hypotension    Anemia, normocytic normochromic    Hyperglycemia    Dyspnea and respiratory abnormalities    Congestive heart failure ([address]    Cardiac related syncope    Bradycardia    Acute kidney injury ([address]    Acquired hypothyroidism    History of chronic urinary tract infection - on Cipro    Hypophosphatemia         Procedures: Pacemaker Implant    HOSPITAL COURSE :           The patient was admitted for: Bradycardia  Hospital Procedures if any: Pacemaker placement  Medications changes recommendation: See List  Follow Up Plan: 1 week for wound check and 1 month for f/u visit      Discharge exam:   Vitals:    12/08/17 1318   BP: (!) 143/56   Pulse: 62   Resp:    Temp:    SpO2:      Neuro: normal  Chest: Clear to ausculation. No wheezing. Cardiac: Regular rate. s1 and s2 auscultated. No murmur noted.   Abdomen/groin: soft, non-tender, without masses or organomegaly  Lower extremity edema: none     Follow up with primary care provider 1 week  Follow up with cardiology 4 weeks  Follow up with other consultant physicians at [place] to discharge home today.     Electronically signed by Lee Ann Christianson, ANGELA on 12/8/2017 at 1:37 PM  [facility]      [phone]

## 2017-12-08 NOTE — PROGRESS NOTES
Nephrology Progress Note      SUBJECTIVE       Pt was seen and examined. No acute issues overnite. Stable hemodynamics . scheduled for pacemaker insertion today. Overall doing better. His breathing is comfortable on room air. [place] [facility] His creatinine continues to improve down to 1.4 mg/dL today. His admitting creatinine was significantly elevated at 4.02. Appetite is fair. He is awaiting possible discharge. Has an elevated TSH 16.7 . Renal US : no hydronephrosis. OBJECTIVE      CURRENT TEMPERATURE:  Temp: 98.1 °F (36.7 °C)  MAXIMUM TEMPERATURE OVER 24HRS:  Temp (24hrs), Av.1 °F (36.7 °C), Min:97.9 °F (36.6 °C), Max:98.4 °F (36.9 °C)    CURRENT RESPIRATORY RATE:  Resp: 17  CURRENT PULSE:  Pulse: 63  CURRENT BLOOD PRESSURE:  BP: (!) 162/57 (10mg IVP Hydralazine given)  24HR BLOOD PRESSURE RANGE:  Systolic (71DDR), IIK:016 , Min:131 , ZDN:033   ; Diastolic (74DMD), FROY:32, Min:57, Max:90    24HR INTAKE/OUTPUT:      Intake/Output Summary (Last 24 hours) at 17 1127  Last data filed at 17 0630   Gross per 24 hour   Intake             2155 ml   Output              500 ml   Net             1655 ml     WEIGHT :  Patient Vitals for the past 96 hrs (Last 3 readings):   Weight   17 0623 232 lb 12.9 oz (105.6 kg)   17 0600 234 lb 2.1 oz (106.2 kg)   17 0501 237 lb (107.5 kg)     PHYSICAL EXAM      GENERAL APPEARANCE:Awake, alert, in no acute distress  SKIN: warm and dry, no rash or erythema  EYES: conjunctivae normal and sclera anicteric  ENT: no thrush, moist mucus membranes   NECK:   No JVD. Midline trachea and no accessory muscles of respiration used  PULMONARY: lungs are clear to auscultation and without any wheezes or rales or rhonchi  CADRDIOVASCULAR:  Regular rate and rhythm with positive S1 and S2 and no rubs  ABDOMEN: soft nontender, bowel sounds present, no ascites.        EXTREMITIES: no pitting lower extremity edema     CURRENT MEDICATIONS        carvedilol (COREG) tablet 12/06/17   0406  12/08/17   0718   MG  2.3  2.2  1.9     RENZO:   Lab Results   Component Value Date    RENZO NEGATIVE 12/06/2017       SPEP:   Lab Results   Component Value Date    PROT 6.1 07/12/2014       HEPBSAG:  Lab Results   Component Value Date    HEPBSAG NONREACTIVE 12/06/2017     HEPCAB:  Lab Results   Component Value Date    HEPCAB NONREACTIVE 12/06/2017     C3:   Lab Results   Component Value Date    C3 104 12/08/2017     C4:   Lab Results   Component Value Date    C4 33 12/08/2017     URINE SODIUM:    Lab Results   Component Value Date    PETE 66 12/06/2017      URINE CREATININE:    Lab Results   Component Value Date    LABCREA 53.4 12/06/2017       URINALYSIS:  U/A:   Lab Results   Component Value Date    NITRU NEGATIVE 12/06/2017    COLORU YELLOW 12/06/2017    PHUR 5.0 12/06/2017    WBCUA 20 TO 50 12/06/2017    RBCUA 0 TO 2 12/06/2017    MUCUS NOT REPORTED 12/06/2017    TRICHOMONAS NOT REPORTED 12/06/2017    YEAST NOT REPORTED 12/06/2017    BACTERIA NOT REPORTED 12/06/2017    SPECGRAV 1.009 12/06/2017    LEUKOCYTESUR SMALL 12/06/2017    UROBILINOGEN Normal 12/06/2017    BILIRUBINUR NEGATIVE 12/06/2017    GLUCOSEU NEGATIVE 12/06/2017    KETUA NEGATIVE 12/06/2017    AMORPHOUS NOT REPORTED 12/06/2017     Serum free light chain ratio : normal     ASSESSMENT      1. SOURAV : secondary to ischemic ATN from hypoperfusion, improving quickly. 2. Essential hypertension  3. Symptomatic bradycardia, resolved :    4. Hypothyroidism . Elevated TSH. 5. Multiple Sclerosis with a neurogenic bladder  6. Diabetes Mellitus type 2   7. History of BPH with a prior TURP  8. History of recurrent urinary tract infections  9. Anemia of chronic disease    PLAN      1. No changes from a nephrology standpoint    2. Avoid nephrotoxins. Cont antibiotics for UTI   3. Okay for discharge from a nephrology standpoint. 4. No urgent need for nephrology follow up based on the resolving nature of the acute kidney injury.   He can be seen by

## 2017-12-08 NOTE — PROGRESS NOTES
atraumatic. Eyes: Conjunctivae are normal. No scleral icterus. Neck: Neck supple. No JVD present. Cardiovascular: Normal rate. Telemetry reveals a paced rhythm   Pulmonary/Chest: Effort normal and breath sounds normal. No respiratory distress. He has no wheezes. Abdominal: Soft. Bowel sounds are normal. He exhibits no distension. There is no tenderness. Musculoskeletal: He exhibits edema (1/4). Neurological: He is alert and oriented to person, place, and time. Coordination (he does use a walker) abnormal.   No gross motor or sensory deficits   Skin: Skin is warm and dry. He is not diaphoretic. Wound is dressed, dry and clean         Data:     I/O (24Hr):     Intake/Output Summary (Last 24 hours) at 12/08/17 1314  Last data filed at 12/08/17 0630   Gross per 24 hour   Intake             2155 ml   Output              500 ml   Net             1655 ml       Labs:    Hematology:  Recent Labs      12/05/17   1627  12/08/17   0718   WBC  8.3  6.8   HGB  8.6*  8.3*   HCT  29.3*  26.7*   PLT  120*  138     Chemistry:  Recent Labs      12/05/17   1627  12/06/17   0406  12/07/17   0420  12/08/17   0718   NA  129*  132*  138  141   K  5.2  4.2  4.2  4.4   CL  94*  96*  102  107   CO2  16*  19*  22  21   GLUCOSE  127*  170*  139*  150*   BUN  73*  58*  37*  27*   CREATININE  4.02*  2.95*  1.79*  1.41*   MG  2.3  2.2   --   1.9   CALCIUM  8.6  8.9  8.7  8.2*   PHOS   --   3.8   --   2.3*     Recent Labs      12/05/17   1627   TSH  16.70*       Lab Results   Component Value Date/Time    SPECIAL NOT REPORTED 07/11/2014 08:56 PM     Lab Results   Component Value Date/Time    CULTURE NO SIGNIFICANT GROWTH 07/11/2014 08:56 PM    CULTURE  07/11/2014 08:56 PM     Performed at [address] [place] [address] [phone]       Lab Results   Component Value Date    PHART 7.34 08/10/2012    PH 7.39 08/09/2012    FIZ2WPQ 42 08/10/2012    PCO2 41 08/09/2012    PO2ART 99 08/10/2012    PO2 238 08/09/2012 BGU9WOL 22.4 08/10/2012    HCO3 24.9 08/09/2012    NBEA 3 08/10/2012    PBEA NOT REPORTED 08/10/2012    YVV5BOF 24 08/10/2012    N3LSUZNC 97 08/10/2012    O2SAT 100 08/09/2012    FIO2 2.0 08/10/2012       Radiology:  Renal ultrasound:  Impression:        Grossly normal sonographic appearance of the kidneys and urinary bladder.  No  hydronephrosis of either kidney. CT brain  Impression:        The ventricles are dilated out of proportion to the sulci.  Normal pressure  hydrocephalus cannot be excluded.  Chronic small vessel ischemic changes.  No  acute brain parenchymal abnormality.          Assessment:        Primary Problem  Principal Problem:    Bradycardia  Active Problems:    Type 1 dissection of ascending aorta (HCC)    Hypertension    Multiple sclerosis (HCC)    Diabetes mellitus ([address]    Neurogenic bladder    Cardiac related syncope    Acute kidney injury ([facility]    Acquired hypothyroidism    History of chronic urinary tract infection - on Cipro      Plan:        Cardiology evaluation  Nephrology evaluation  Will monitor and control Diabetes  Will monitor and control Blood Pressure  Correct electrolyte abnormalities  Outpatient workup of elevated Kappa chains suggested  Check reverse T3 / anticipate thyroid replacement    Outpatient neurology evaluation to rule out normal pressure hydrocephalus    PT eval  Discharge planning in progress  Will follow   Thanks    IP CONSULT TO INTERNAL MEDICINE  IP CONSULT TO NEPHROLOGY  IP CONSULT TO [address] [place] [address],[address],   12/8/2017  1:14 PM

## 2017-12-11 LAB — ANTI-NUCLEAR ANTIBODY (ANA): NEGATIVE

## 2017-12-12 LAB
ANCA MYELOPEROXIDASE: 36 AU/ML
ANCA PROTEINASE 3: 28 AU/ML

## 2017-12-14 LAB — T3 REVERSE: 67.3 NG/DL (ref 9–27)

## 2018-03-07 ENCOUNTER — HOSPITAL ENCOUNTER (OUTPATIENT)
Dept: PREADMISSION TESTING | Age: 68
Discharge: HOME | End: 2018-03-11
Payer: MEDICARE

## 2018-03-07 VITALS
RESPIRATION RATE: 18 BRPM | DIASTOLIC BLOOD PRESSURE: 86 MMHG | HEART RATE: 73 BPM | WEIGHT: 220 LBS | OXYGEN SATURATION: 97 % | HEIGHT: 72 IN | TEMPERATURE: 97.3 F | SYSTOLIC BLOOD PRESSURE: 148 MMHG | BODY MASS INDEX: 29.8 KG/M2

## 2018-03-07 LAB
ANION GAP SERPL CALCULATED.3IONS-SCNC: 16 MMOL/L (ref 9–17)
BUN BLDV-MCNC: 30 MG/DL (ref 8–23)
CHLORIDE BLD-SCNC: 101 MMOL/L (ref 98–107)
CO2: 26 MMOL/L (ref 20–31)
CREAT SERPL-MCNC: 1.25 MG/DL (ref 0.7–1.2)
EKG ATRIAL RATE: 242 BPM
EKG P AXIS: -69 DEGREES
EKG Q-T INTERVAL: 428 MS
EKG QRS DURATION: 92 MS
EKG QTC CALCULATION (BAZETT): 448 MS
EKG R AXIS: -34 DEGREES
EKG T AXIS: 110 DEGREES
EKG VENTRICULAR RATE: 66 BPM
GFR AFRICAN AMERICAN: >60 ML/MIN
GFR NON-AFRICAN AMERICAN: 57 ML/MIN
GFR SERPL CREATININE-BSD FRML MDRD: ABNORMAL ML/MIN/{1.73_M2}
GFR SERPL CREATININE-BSD FRML MDRD: ABNORMAL ML/MIN/{1.73_M2}
GLUCOSE BLD-MCNC: 94 MG/DL (ref 70–99)
POTASSIUM SERPL-SCNC: 4.2 MMOL/L (ref 3.7–5.3)
SODIUM BLD-SCNC: 143 MMOL/L (ref 135–144)

## 2018-03-07 PROCEDURE — 84520 ASSAY OF UREA NITROGEN: CPT

## 2018-03-07 PROCEDURE — 80051 ELECTROLYTE PANEL: CPT

## 2018-03-07 PROCEDURE — 36415 COLL VENOUS BLD VENIPUNCTURE: CPT

## 2018-03-07 PROCEDURE — 82565 ASSAY OF CREATININE: CPT

## 2018-03-07 PROCEDURE — 82947 ASSAY GLUCOSE BLOOD QUANT: CPT

## 2018-03-07 PROCEDURE — 93005 ELECTROCARDIOGRAM TRACING: CPT

## 2018-03-07 PROCEDURE — 87086 URINE CULTURE/COLONY COUNT: CPT

## 2018-03-07 RX ORDER — SODIUM CHLORIDE, SODIUM LACTATE, POTASSIUM CHLORIDE, CALCIUM CHLORIDE 600; 310; 30; 20 MG/100ML; MG/100ML; MG/100ML; MG/100ML
1000 INJECTION, SOLUTION INTRAVENOUS CONTINUOUS
Status: CANCELLED | OUTPATIENT
Start: 2018-03-07

## 2018-03-07 NOTE — H&P
classified elsewhere; Pacemaker; Pneumonia; Sleep apnea; and Wears glasses. PLANS:   1.  Cystoscopy, prostate biopsy with US    GUILHERME WHITE PA-C  Electronically signed 3/7/2018 at 2:53 PM

## 2018-03-08 LAB
CULTURE: NO GROWTH
CULTURE: NORMAL
Lab: NORMAL
SPECIMEN DESCRIPTION: NORMAL
STATUS: NORMAL

## 2018-03-28 ENCOUNTER — HOSPITAL ENCOUNTER (OUTPATIENT)
Age: 68
Setting detail: OUTPATIENT SURGERY
Discharge: HOME | End: 2018-03-28
Attending: UROLOGY | Admitting: UROLOGY
Payer: MEDICARE

## 2018-03-28 ENCOUNTER — ANESTHESIA (OUTPATIENT)
Dept: OPERATING ROOM | Age: 68
End: 2018-03-28
Payer: MEDICARE

## 2018-03-28 ENCOUNTER — ANESTHESIA EVENT (OUTPATIENT)
Dept: OPERATING ROOM | Age: 68
End: 2018-03-28
Payer: MEDICARE

## 2018-03-28 ENCOUNTER — HOSPITAL ENCOUNTER (OUTPATIENT)
Dept: ULTRASOUND IMAGING | Age: 68
Discharge: HOME | End: 2018-03-30
Payer: MEDICARE

## 2018-03-28 VITALS
DIASTOLIC BLOOD PRESSURE: 62 MMHG | RESPIRATION RATE: 16 BRPM | BODY MASS INDEX: 29.8 KG/M2 | TEMPERATURE: 96.8 F | HEIGHT: 72 IN | OXYGEN SATURATION: 98 % | HEART RATE: 63 BPM | WEIGHT: 220 LBS | SYSTOLIC BLOOD PRESSURE: 132 MMHG

## 2018-03-28 VITALS — SYSTOLIC BLOOD PRESSURE: 94 MMHG | OXYGEN SATURATION: 100 % | DIASTOLIC BLOOD PRESSURE: 51 MMHG

## 2018-03-28 DIAGNOSIS — N17.9 ACUTE KIDNEY INJURY (HCC): Primary | ICD-10-CM

## 2018-03-28 LAB
CULTURE: NORMAL
CULTURE: NORMAL
GLUCOSE BLD-MCNC: 128 MG/DL (ref 74–100)
Lab: NORMAL
POC POTASSIUM: 3.9 MMOL/L (ref 3.5–4.5)
SPECIMEN DESCRIPTION: NORMAL
STATUS: NORMAL

## 2018-03-28 PROCEDURE — 84132 ASSAY OF SERUM POTASSIUM: CPT

## 2018-03-28 PROCEDURE — 88305 TISSUE EXAM BY PATHOLOGIST: CPT

## 2018-03-28 PROCEDURE — 2500000003 HC RX 250 WO HCPCS: Performed by: NURSE ANESTHETIST, CERTIFIED REGISTERED

## 2018-03-28 PROCEDURE — 88344 IMHCHEM/IMCYTCHM EA MLT ANTB: CPT

## 2018-03-28 PROCEDURE — 6360000002 HC RX W HCPCS: Performed by: NURSE ANESTHETIST, CERTIFIED REGISTERED

## 2018-03-28 PROCEDURE — 82947 ASSAY GLUCOSE BLOOD QUANT: CPT

## 2018-03-28 PROCEDURE — 76942 ECHO GUIDE FOR BIOPSY: CPT

## 2018-03-28 PROCEDURE — 87086 URINE CULTURE/COLONY COUNT: CPT

## 2018-03-28 PROCEDURE — 7100000040 HC SPAR PHASE II RECOVERY - FIRST 15 MIN: Performed by: UROLOGY

## 2018-03-28 PROCEDURE — 3700000001 HC ADD 15 MINUTES (ANESTHESIA): Performed by: UROLOGY

## 2018-03-28 PROCEDURE — 7100000041 HC SPAR PHASE II RECOVERY - ADDTL 15 MIN: Performed by: UROLOGY

## 2018-03-28 PROCEDURE — 3700000000 HC ANESTHESIA ATTENDED CARE: Performed by: UROLOGY

## 2018-03-28 PROCEDURE — 2500000003 HC RX 250 WO HCPCS: Performed by: UROLOGY

## 2018-03-28 PROCEDURE — 6370000000 HC RX 637 (ALT 250 FOR IP): Performed by: UROLOGY

## 2018-03-28 PROCEDURE — 3600000002 HC SURGERY LEVEL 2 BASE: Performed by: UROLOGY

## 2018-03-28 PROCEDURE — 2580000003 HC RX 258: Performed by: ANESTHESIOLOGY

## 2018-03-28 PROCEDURE — 3600000012 HC SURGERY LEVEL 2 ADDTL 15MIN: Performed by: UROLOGY

## 2018-03-28 RX ORDER — WATER 1000 ML/1000ML
INJECTION, SOLUTION INTRAVENOUS PRN
Status: DISCONTINUED | OUTPATIENT
Start: 2018-03-28 | End: 2018-03-28 | Stop reason: HOSPADM

## 2018-03-28 RX ORDER — SODIUM CHLORIDE, SODIUM LACTATE, POTASSIUM CHLORIDE, CALCIUM CHLORIDE 600; 310; 30; 20 MG/100ML; MG/100ML; MG/100ML; MG/100ML
1000 INJECTION, SOLUTION INTRAVENOUS CONTINUOUS
Status: DISCONTINUED | OUTPATIENT
Start: 2018-03-28 | End: 2018-03-28 | Stop reason: HOSPADM

## 2018-03-28 RX ORDER — OXYCODONE HYDROCHLORIDE AND ACETAMINOPHEN 5; 325 MG/1; MG/1
1-2 TABLET ORAL EVERY 4 HOURS PRN
Qty: 15 TABLET | Refills: 0 | Status: SHIPPED | OUTPATIENT
Start: 2018-03-28 | End: 2018-04-04

## 2018-03-28 RX ORDER — DOCUSATE SODIUM 100 MG/1
100 CAPSULE, LIQUID FILLED ORAL 2 TIMES DAILY
Qty: 20 CAPSULE | Refills: 0 | Status: SHIPPED | OUTPATIENT
Start: 2018-03-28

## 2018-03-28 RX ORDER — LIDOCAINE HYDROCHLORIDE 10 MG/ML
INJECTION, SOLUTION EPIDURAL; INFILTRATION; INTRACAUDAL; PERINEURAL PRN
Status: DISCONTINUED | OUTPATIENT
Start: 2018-03-28 | End: 2018-03-28 | Stop reason: SDUPTHER

## 2018-03-28 RX ORDER — PROPOFOL 10 MG/ML
INJECTION, EMULSION INTRAVENOUS PRN
Status: DISCONTINUED | OUTPATIENT
Start: 2018-03-28 | End: 2018-03-28 | Stop reason: SDUPTHER

## 2018-03-28 RX ORDER — MIDAZOLAM HYDROCHLORIDE 1 MG/ML
INJECTION INTRAMUSCULAR; INTRAVENOUS PRN
Status: DISCONTINUED | OUTPATIENT
Start: 2018-03-28 | End: 2018-03-28 | Stop reason: SDUPTHER

## 2018-03-28 RX ORDER — FENTANYL CITRATE 50 UG/ML
INJECTION, SOLUTION INTRAMUSCULAR; INTRAVENOUS PRN
Status: DISCONTINUED | OUTPATIENT
Start: 2018-03-28 | End: 2018-03-28 | Stop reason: SDUPTHER

## 2018-03-28 RX ORDER — LIDOCAINE HYDROCHLORIDE 10 MG/ML
INJECTION, SOLUTION EPIDURAL; INFILTRATION; INTRACAUDAL; PERINEURAL PRN
Status: DISCONTINUED | OUTPATIENT
Start: 2018-03-28 | End: 2018-03-28 | Stop reason: HOSPADM

## 2018-03-28 RX ADMIN — SODIUM CHLORIDE, POTASSIUM CHLORIDE, SODIUM LACTATE AND CALCIUM CHLORIDE 1000 ML: 600; 310; 30; 20 INJECTION, SOLUTION INTRAVENOUS at 10:44

## 2018-03-28 RX ADMIN — LIDOCAINE HYDROCHLORIDE 20 MG: 10 INJECTION, SOLUTION EPIDURAL; INFILTRATION; INTRACAUDAL; PERINEURAL at 12:11

## 2018-03-28 RX ADMIN — PROPOFOL 30 MG: 10 INJECTION, EMULSION INTRAVENOUS at 12:11

## 2018-03-28 RX ADMIN — PROPOFOL 30 MG: 10 INJECTION, EMULSION INTRAVENOUS at 12:15

## 2018-03-28 RX ADMIN — FENTANYL CITRATE 25 MCG: 50 INJECTION INTRAMUSCULAR; INTRAVENOUS at 12:25

## 2018-03-28 RX ADMIN — FENTANYL CITRATE 25 MCG: 50 INJECTION INTRAMUSCULAR; INTRAVENOUS at 12:14

## 2018-03-28 RX ADMIN — FENTANYL CITRATE 25 MCG: 50 INJECTION INTRAMUSCULAR; INTRAVENOUS at 12:20

## 2018-03-28 RX ADMIN — PROPOFOL 100 MG: 10 INJECTION, EMULSION INTRAVENOUS at 12:20

## 2018-03-28 RX ADMIN — MIDAZOLAM HYDROCHLORIDE 2 MG: 1 INJECTION, SOLUTION INTRAMUSCULAR; INTRAVENOUS at 12:11

## 2018-03-28 RX ADMIN — FENTANYL CITRATE 25 MCG: 50 INJECTION INTRAMUSCULAR; INTRAVENOUS at 12:15

## 2018-03-28 ASSESSMENT — PULMONARY FUNCTION TESTS
PIF_VALUE: 1

## 2018-03-28 ASSESSMENT — PAIN - FUNCTIONAL ASSESSMENT: PAIN_FUNCTIONAL_ASSESSMENT: 0-10

## 2018-03-28 ASSESSMENT — PAIN SCALES - GENERAL
PAINLEVEL_OUTOF10: 0

## 2018-03-28 ASSESSMENT — ENCOUNTER SYMPTOMS: SHORTNESS OF BREATH: 1

## 2018-03-28 NOTE — ANESTHESIA PRE PROCEDURE
Type 1 dissection of ascending aorta (HCC) I71.01    Essential hypertension I10    Ascending aortic aneurysm (HCC) I71.2    Multiple sclerosis ([facility]) G35    Rib fractures S22.39XA    Neuromuscular disorder ([facility]) G70.9    Acute CHF (congestive heart failure) (HCC) I50.9    Type 2 diabetes mellitus with diabetic nephropathy, without long-term current use of insulin (HCC) E11.21    Pneumonia J18.9    Neurogenic bladder N31.9    Fever R50.9    Hypotension I95.9    Anemia, normocytic normochromic D64.9    Hyperglycemia R73.9    Dyspnea and respiratory abnormalities R06.00, R06.89    Congestive heart failure (HCC) I50.9    Cardiac related syncope R55    Bradycardia R00.1    Acute kidney injury ([facility] N17.9    Acquired hypothyroidism E03.9    History of chronic urinary tract infection - on Cipro Z87.440    Hypophosphatemia E83.39       Past Medical History:        Diagnosis Date    A-fib ([facility]     Acquired hypothyroidism 12/6/2017    Bradycardia     CHF (congestive heart failure) ([facility])     Diabetes mellitus ([facility])     Hyperlipidemia     Hypertension     Multiple sclerosis ([facility]     Neurogenic bladder 7/12/2014    self caths (non compliant)    Neuromuscular disorder ([facility])     Other disorders of kidney and ureter in diseases classified elsewhere     Pacemaker     Pneumonia     Sleep apnea     possible but not tested    Wears glasses        Past Surgical History:        Procedure Laterality Date    ABDOMINAL AORTIC ANEURYSM REPAIR  8/9/12    emergent repair of type A dissection    ACHILLES TENDON SURGERY Left     APPENDECTOMY      COLONOSCOPY      PACEMAKER INSERTION  12/08/2017    TURP         Social History:    Social History   Substance Use Topics    Smoking status: Never Smoker    Smokeless tobacco: Never Used    Alcohol use No                                Counseling given: Not Answered      Vital Signs (Current):   Vitals:    03/28/18 1010   Weight: 220 lb (99.8 kg)   Height: Cardiovascular:  Exercise tolerance: poor (<4 METS),   (+) hypertension:, pacemaker: pacemaker, CHF:, hyperlipidemia      ECG reviewed  Rhythm: regular  Rate: normal  Echocardiogram reviewed  Stress test reviewed       Beta Blocker:  Dose within 24 Hrs         Neuro/Psych:   (+) neuromuscular disease: multiple sclerosis,             GI/Hepatic/Renal:   (+) renal disease:,           Endo/Other:    (+) Diabetes, hypothyroidism::., .                 Abdominal:         (-) obese     Vascular:   + PVD, aortic or cerebral, . Anesthesia Plan      MAC     ASA 4       Induction: intravenous. Anesthetic plan and risks discussed with patient and spouse. Plan discussed with CRNA.                   Mitchell Ornelas MD   3/28/2018

## 2018-03-29 LAB
CULTURE: NO GROWTH
CULTURE: NORMAL
Lab: NORMAL
SPECIMEN DESCRIPTION: NORMAL
STATUS: NORMAL

## 2018-03-30 LAB — SURGICAL PATHOLOGY REPORT: NORMAL

## 2018-04-08 ENCOUNTER — HOSPITAL ENCOUNTER (EMERGENCY)
Age: 68
Discharge: HOME | DRG: 813 | End: 2018-04-08
Attending: EMERGENCY MEDICINE
Payer: MEDICARE

## 2018-04-08 ENCOUNTER — HOSPITAL ENCOUNTER (INPATIENT)
Age: 68
LOS: 3 days | Discharge: HOME | DRG: 813 | End: 2018-04-11
Attending: EMERGENCY MEDICINE | Admitting: FAMILY MEDICINE
Payer: MEDICARE

## 2018-04-08 VITALS
OXYGEN SATURATION: 99 % | RESPIRATION RATE: 16 BRPM | SYSTOLIC BLOOD PRESSURE: 136 MMHG | HEART RATE: 73 BPM | TEMPERATURE: 98.1 F | DIASTOLIC BLOOD PRESSURE: 85 MMHG

## 2018-04-08 DIAGNOSIS — D50.0 BLOOD LOSS ANEMIA: ICD-10-CM

## 2018-04-08 DIAGNOSIS — K92.2 LOWER GI BLEED: Primary | ICD-10-CM

## 2018-04-08 PROBLEM — K62.5 RECTAL BLEED: Status: ACTIVE | Noted: 2018-04-08

## 2018-04-08 LAB
ABSOLUTE EOS #: 0.11 K/UL (ref 0–0.44)
ABSOLUTE EOS #: 0.27 K/UL (ref 0–0.44)
ABSOLUTE IMMATURE GRANULOCYTE: 0.03 K/UL (ref 0–0.3)
ABSOLUTE IMMATURE GRANULOCYTE: <0.03 K/UL (ref 0–0.3)
ABSOLUTE LYMPH #: 1.38 K/UL (ref 1.1–3.7)
ABSOLUTE LYMPH #: 1.47 K/UL (ref 1.1–3.7)
ABSOLUTE MONO #: 0.53 K/UL (ref 0.1–1.2)
ABSOLUTE MONO #: 0.55 K/UL (ref 0.1–1.2)
ANION GAP SERPL CALCULATED.3IONS-SCNC: 12 MMOL/L (ref 9–17)
BASOPHILS # BLD: 1 % (ref 0–2)
BASOPHILS # BLD: 1 % (ref 0–2)
BASOPHILS ABSOLUTE: 0.03 K/UL (ref 0–0.2)
BASOPHILS ABSOLUTE: 0.04 K/UL (ref 0–0.2)
BLOOD BANK SPECIMEN: NORMAL
BUN BLDV-MCNC: 33 MG/DL (ref 8–23)
BUN/CREAT BLD: ABNORMAL (ref 9–20)
CALCIUM SERPL-MCNC: 9.1 MG/DL (ref 8.6–10.4)
CHLORIDE BLD-SCNC: 104 MMOL/L (ref 98–107)
CO2: 25 MMOL/L (ref 20–31)
CREAT SERPL-MCNC: 1.22 MG/DL (ref 0.7–1.2)
DIFFERENTIAL TYPE: ABNORMAL
DIFFERENTIAL TYPE: ABNORMAL
EOSINOPHILS RELATIVE PERCENT: 2 % (ref 1–4)
EOSINOPHILS RELATIVE PERCENT: 4 % (ref 1–4)
GFR AFRICAN AMERICAN: >60 ML/MIN
GFR NON-AFRICAN AMERICAN: 59 ML/MIN
GFR SERPL CREATININE-BSD FRML MDRD: ABNORMAL ML/MIN/{1.73_M2}
GFR SERPL CREATININE-BSD FRML MDRD: ABNORMAL ML/MIN/{1.73_M2}
GLUCOSE BLD-MCNC: 137 MG/DL (ref 70–99)
HCT VFR BLD CALC: 28.2 % (ref 40.7–50.3)
HCT VFR BLD CALC: 32 % (ref 40.7–50.3)
HEMOGLOBIN: 8.3 G/DL (ref 13–17)
HEMOGLOBIN: 8.7 G/DL (ref 13–17)
HEMOGLOBIN: 9.8 G/DL (ref 13–17)
IMMATURE GRANULOCYTES: 0 %
IMMATURE GRANULOCYTES: 0 %
INR BLD: 1.4
LYMPHOCYTES # BLD: 21 % (ref 24–43)
LYMPHOCYTES # BLD: 21 % (ref 24–43)
MCH RBC QN AUTO: 25.7 PG (ref 25.2–33.5)
MCH RBC QN AUTO: 26.1 PG (ref 25.2–33.5)
MCHC RBC AUTO-ENTMCNC: 30.6 G/DL (ref 28.4–34.8)
MCHC RBC AUTO-ENTMCNC: 30.9 G/DL (ref 28.4–34.8)
MCV RBC AUTO: 83.8 FL (ref 82.6–102.9)
MCV RBC AUTO: 84.7 FL (ref 82.6–102.9)
MONOCYTES # BLD: 8 % (ref 3–12)
MONOCYTES # BLD: 8 % (ref 3–12)
NRBC AUTOMATED: 0 PER 100 WBC
NRBC AUTOMATED: 0 PER 100 WBC
PDW BLD-RTO: 14.4 % (ref 11.8–14.4)
PDW BLD-RTO: 14.4 % (ref 11.8–14.4)
PLATELET # BLD: 156 K/UL (ref 138–453)
PLATELET # BLD: 156 K/UL (ref 138–453)
PLATELET ESTIMATE: ABNORMAL
PLATELET ESTIMATE: ABNORMAL
PMV BLD AUTO: 11.9 FL (ref 8.1–13.5)
PMV BLD AUTO: 12.5 FL (ref 8.1–13.5)
POTASSIUM SERPL-SCNC: 3.8 MMOL/L (ref 3.7–5.3)
PROTHROMBIN TIME: 14.3 SEC (ref 9–12)
RBC # BLD: 3.33 M/UL (ref 4.21–5.77)
RBC # BLD: 3.82 M/UL (ref 4.21–5.77)
RBC # BLD: ABNORMAL 10*6/UL
RBC # BLD: ABNORMAL 10*6/UL
SEG NEUTROPHILS: 66 % (ref 36–65)
SEG NEUTROPHILS: 68 % (ref 36–65)
SEGMENTED NEUTROPHILS ABSOLUTE COUNT: 4.51 K/UL (ref 1.5–8.1)
SEGMENTED NEUTROPHILS ABSOLUTE COUNT: 4.59 K/UL (ref 1.5–8.1)
SODIUM BLD-SCNC: 141 MMOL/L (ref 135–144)
WBC # BLD: 6.7 K/UL (ref 3.5–11.3)
WBC # BLD: 6.9 K/UL (ref 3.5–11.3)
WBC # BLD: ABNORMAL 10*3/UL
WBC # BLD: ABNORMAL 10*3/UL

## 2018-04-08 PROCEDURE — 96365 THER/PROPH/DIAG IV INF INIT: CPT

## 2018-04-08 PROCEDURE — 86850 RBC ANTIBODY SCREEN: CPT

## 2018-04-08 PROCEDURE — 99223 1ST HOSP IP/OBS HIGH 75: CPT | Performed by: FAMILY MEDICINE

## 2018-04-08 PROCEDURE — 6370000000 HC RX 637 (ALT 250 FOR IP): Performed by: FAMILY MEDICINE

## 2018-04-08 PROCEDURE — 99284 EMERGENCY DEPT VISIT MOD MDM: CPT

## 2018-04-08 PROCEDURE — 2500000003 HC RX 250 WO HCPCS: Performed by: STUDENT IN AN ORGANIZED HEALTH CARE EDUCATION/TRAINING PROGRAM

## 2018-04-08 PROCEDURE — 2580000003 HC RX 258: Performed by: EMERGENCY MEDICINE

## 2018-04-08 PROCEDURE — 86901 BLOOD TYPING SEROLOGIC RH(D): CPT

## 2018-04-08 PROCEDURE — 85610 PROTHROMBIN TIME: CPT

## 2018-04-08 PROCEDURE — 36415 COLL VENOUS BLD VENIPUNCTURE: CPT

## 2018-04-08 PROCEDURE — 85025 COMPLETE CBC W/AUTO DIFF WBC: CPT

## 2018-04-08 PROCEDURE — 85018 HEMOGLOBIN: CPT

## 2018-04-08 PROCEDURE — 86920 COMPATIBILITY TEST SPIN: CPT

## 2018-04-08 PROCEDURE — 1200000000 HC SEMI PRIVATE

## 2018-04-08 PROCEDURE — 99283 EMERGENCY DEPT VISIT LOW MDM: CPT

## 2018-04-08 PROCEDURE — 2580000003 HC RX 258: Performed by: STUDENT IN AN ORGANIZED HEALTH CARE EDUCATION/TRAINING PROGRAM

## 2018-04-08 PROCEDURE — 86900 BLOOD TYPING SEROLOGIC ABO: CPT

## 2018-04-08 PROCEDURE — 99222 1ST HOSP IP/OBS MODERATE 55: CPT | Performed by: INTERNAL MEDICINE

## 2018-04-08 PROCEDURE — 2580000003 HC RX 258: Performed by: FAMILY MEDICINE

## 2018-04-08 PROCEDURE — 2500000003 HC RX 250 WO HCPCS: Performed by: EMERGENCY MEDICINE

## 2018-04-08 PROCEDURE — 80048 BASIC METABOLIC PNL TOTAL CA: CPT

## 2018-04-08 RX ORDER — SODIUM CHLORIDE 9 MG/ML
INJECTION, SOLUTION INTRAVENOUS CONTINUOUS
Status: DISCONTINUED | OUTPATIENT
Start: 2018-04-08 | End: 2018-04-11 | Stop reason: HOSPADM

## 2018-04-08 RX ORDER — HYDRALAZINE HYDROCHLORIDE 50 MG/1
50 TABLET, FILM COATED ORAL 2 TIMES DAILY
Status: DISCONTINUED | OUTPATIENT
Start: 2018-04-08 | End: 2018-04-11 | Stop reason: HOSPADM

## 2018-04-08 RX ORDER — MORPHINE SULFATE 4 MG/ML
4 INJECTION, SOLUTION INTRAMUSCULAR; INTRAVENOUS
Status: DISCONTINUED | OUTPATIENT
Start: 2018-04-08 | End: 2018-04-11 | Stop reason: HOSPADM

## 2018-04-08 RX ORDER — DOCUSATE SODIUM 100 MG/1
100 CAPSULE, LIQUID FILLED ORAL 2 TIMES DAILY
Status: DISCONTINUED | OUTPATIENT
Start: 2018-04-08 | End: 2018-04-11 | Stop reason: HOSPADM

## 2018-04-08 RX ORDER — POLYETHYLENE GLYCOL 3350 17 G/17G
17 POWDER, FOR SOLUTION ORAL DAILY
Status: DISCONTINUED | OUTPATIENT
Start: 2018-04-08 | End: 2018-04-11 | Stop reason: HOSPADM

## 2018-04-08 RX ORDER — ACETAMINOPHEN 325 MG/1
650 TABLET ORAL EVERY 4 HOURS PRN
Status: DISCONTINUED | OUTPATIENT
Start: 2018-04-08 | End: 2018-04-11 | Stop reason: HOSPADM

## 2018-04-08 RX ORDER — CARVEDILOL 6.25 MG/1
6.25 TABLET ORAL 2 TIMES DAILY WITH MEALS
Status: DISCONTINUED | OUTPATIENT
Start: 2018-04-08 | End: 2018-04-11 | Stop reason: HOSPADM

## 2018-04-08 RX ORDER — ONDANSETRON 2 MG/ML
4 INJECTION INTRAMUSCULAR; INTRAVENOUS EVERY 6 HOURS PRN
Status: DISCONTINUED | OUTPATIENT
Start: 2018-04-08 | End: 2018-04-11 | Stop reason: HOSPADM

## 2018-04-08 RX ORDER — FUROSEMIDE 40 MG/1
40 TABLET ORAL DAILY
Status: DISCONTINUED | OUTPATIENT
Start: 2018-04-08 | End: 2018-04-11 | Stop reason: HOSPADM

## 2018-04-08 RX ORDER — SODIUM CHLORIDE 0.9 % (FLUSH) 0.9 %
10 SYRINGE (ML) INJECTION PRN
Status: DISCONTINUED | OUTPATIENT
Start: 2018-04-08 | End: 2018-04-11 | Stop reason: HOSPADM

## 2018-04-08 RX ORDER — MORPHINE SULFATE 4 MG/ML
2 INJECTION, SOLUTION INTRAMUSCULAR; INTRAVENOUS
Status: DISCONTINUED | OUTPATIENT
Start: 2018-04-08 | End: 2018-04-11 | Stop reason: HOSPADM

## 2018-04-08 RX ORDER — ATORVASTATIN CALCIUM 20 MG/1
20 TABLET, FILM COATED ORAL DAILY
Status: DISCONTINUED | OUTPATIENT
Start: 2018-04-08 | End: 2018-04-11 | Stop reason: HOSPADM

## 2018-04-08 RX ORDER — TIMOLOL MALEATE 5 MG/ML
1 SOLUTION/ DROPS OPHTHALMIC DAILY
Status: DISCONTINUED | OUTPATIENT
Start: 2018-04-08 | End: 2018-04-11 | Stop reason: HOSPADM

## 2018-04-08 RX ORDER — POLYETHYLENE GLYCOL 3350 17 G/17G
17 POWDER, FOR SOLUTION ORAL DAILY
Qty: 500 G | Refills: 0 | Status: SHIPPED | OUTPATIENT
Start: 2018-04-08

## 2018-04-08 RX ORDER — LEVOTHYROXINE SODIUM 0.03 MG/1
25 TABLET ORAL DAILY
Status: DISCONTINUED | OUTPATIENT
Start: 2018-04-08 | End: 2018-04-11 | Stop reason: HOSPADM

## 2018-04-08 RX ORDER — DOCUSATE SODIUM 100 MG/1
100 CAPSULE, LIQUID FILLED ORAL 2 TIMES DAILY
Qty: 100 CAPSULE | Refills: 0 | Status: SHIPPED | OUTPATIENT
Start: 2018-04-08

## 2018-04-08 RX ORDER — TROSPIUM CHLORIDE 20 MG/1
20 TABLET, FILM COATED ORAL 2 TIMES DAILY
Status: DISCONTINUED | OUTPATIENT
Start: 2018-04-08 | End: 2018-04-11 | Stop reason: HOSPADM

## 2018-04-08 RX ORDER — SODIUM CHLORIDE 0.9 % (FLUSH) 0.9 %
10 SYRINGE (ML) INJECTION EVERY 12 HOURS SCHEDULED
Status: DISCONTINUED | OUTPATIENT
Start: 2018-04-08 | End: 2018-04-11 | Stop reason: HOSPADM

## 2018-04-08 RX ADMIN — SODIUM CHLORIDE: 9 INJECTION, SOLUTION INTRAVENOUS at 16:54

## 2018-04-08 RX ADMIN — TRANEXAMIC ACID 1000 MG: 100 INJECTION, SOLUTION INTRAVENOUS at 05:07

## 2018-04-08 RX ADMIN — ATORVASTATIN CALCIUM 20 MG: 20 TABLET, FILM COATED ORAL at 22:27

## 2018-04-08 RX ADMIN — DOCUSATE SODIUM 100 MG: 100 CAPSULE ORAL at 22:27

## 2018-04-08 RX ADMIN — POLYETHYLENE GLYCOL 3350 17 G: 17 POWDER, FOR SOLUTION ORAL at 16:54

## 2018-04-08 RX ADMIN — TRANEXAMIC ACID 1000 MG: 100 INJECTION, SOLUTION INTRAVENOUS at 14:26

## 2018-04-08 RX ADMIN — CARVEDILOL 6.25 MG: 6.25 TABLET, FILM COATED ORAL at 16:54

## 2018-04-08 RX ADMIN — LEVOTHYROXINE SODIUM 25 MCG: 25 TABLET ORAL at 16:54

## 2018-04-08 ASSESSMENT — ENCOUNTER SYMPTOMS
NAUSEA: 0
SORE THROAT: 0
ABDOMINAL PAIN: 0
SHORTNESS OF BREATH: 0
SINUS PRESSURE: 0
DIARRHEA: 0
WHEEZING: 0
ABDOMINAL PAIN: 0
ANAL BLEEDING: 1
BLOOD IN STOOL: 1
SHORTNESS OF BREATH: 0
ANAL BLEEDING: 1
VOMITING: 0
COUGH: 0
VOMITING: 0
RHINORRHEA: 0
NAUSEA: 0
VOICE CHANGE: 0
CONSTIPATION: 0

## 2018-04-08 ASSESSMENT — PAIN SCALES - GENERAL
PAINLEVEL_OUTOF10: 0
PAINLEVEL_OUTOF10: 0

## 2018-04-09 ENCOUNTER — ANESTHESIA EVENT (OUTPATIENT)
Dept: ENDOSCOPY | Age: 68
DRG: 813 | End: 2018-04-09
Payer: MEDICARE

## 2018-04-09 ENCOUNTER — ANESTHESIA (OUTPATIENT)
Dept: ENDOSCOPY | Age: 68
DRG: 813 | End: 2018-04-09
Payer: MEDICARE

## 2018-04-09 VITALS
DIASTOLIC BLOOD PRESSURE: 28 MMHG | OXYGEN SATURATION: 100 % | SYSTOLIC BLOOD PRESSURE: 88 MMHG | RESPIRATION RATE: 15 BRPM

## 2018-04-09 LAB
ANION GAP SERPL CALCULATED.3IONS-SCNC: 12 MMOL/L (ref 9–17)
BUN BLDV-MCNC: 28 MG/DL (ref 8–23)
BUN/CREAT BLD: ABNORMAL (ref 9–20)
CALCIUM SERPL-MCNC: 9 MG/DL (ref 8.6–10.4)
CHLORIDE BLD-SCNC: 106 MMOL/L (ref 98–107)
CO2: 25 MMOL/L (ref 20–31)
CREAT SERPL-MCNC: 1.16 MG/DL (ref 0.7–1.2)
GFR AFRICAN AMERICAN: >60 ML/MIN
GFR NON-AFRICAN AMERICAN: >60 ML/MIN
GFR SERPL CREATININE-BSD FRML MDRD: ABNORMAL ML/MIN/{1.73_M2}
GFR SERPL CREATININE-BSD FRML MDRD: ABNORMAL ML/MIN/{1.73_M2}
GLUCOSE BLD-MCNC: 143 MG/DL (ref 70–99)
HCT VFR BLD CALC: 25.3 % (ref 40.7–50.3)
HEMOGLOBIN: 7.5 G/DL (ref 13–17)
HEMOGLOBIN: 7.6 G/DL (ref 13–17)
HEMOGLOBIN: 7.9 G/DL (ref 13–17)
INR BLD: 1.1
MCH RBC QN AUTO: 26.1 PG (ref 25.2–33.5)
MCHC RBC AUTO-ENTMCNC: 31.2 G/DL (ref 28.4–34.8)
MCV RBC AUTO: 83.5 FL (ref 82.6–102.9)
NRBC AUTOMATED: 0 PER 100 WBC
PDW BLD-RTO: 14.5 % (ref 11.8–14.4)
PLATELET # BLD: 149 K/UL (ref 138–453)
PMV BLD AUTO: 12.7 FL (ref 8.1–13.5)
POTASSIUM SERPL-SCNC: 3.6 MMOL/L (ref 3.7–5.3)
PROTHROMBIN TIME: 11.7 SEC (ref 9–12)
RBC # BLD: 3.03 M/UL (ref 4.21–5.77)
SODIUM BLD-SCNC: 143 MMOL/L (ref 135–144)
WBC # BLD: 6 K/UL (ref 3.5–11.3)

## 2018-04-09 PROCEDURE — 36415 COLL VENOUS BLD VENIPUNCTURE: CPT

## 2018-04-09 PROCEDURE — 3700000000 HC ANESTHESIA ATTENDED CARE: Performed by: INTERNAL MEDICINE

## 2018-04-09 PROCEDURE — 6370000000 HC RX 637 (ALT 250 FOR IP): Performed by: INTERNAL MEDICINE

## 2018-04-09 PROCEDURE — 80048 BASIC METABOLIC PNL TOTAL CA: CPT

## 2018-04-09 PROCEDURE — 6360000002 HC RX W HCPCS: Performed by: NURSE ANESTHETIST, CERTIFIED REGISTERED

## 2018-04-09 PROCEDURE — 2500000003 HC RX 250 WO HCPCS: Performed by: NURSE ANESTHETIST, CERTIFIED REGISTERED

## 2018-04-09 PROCEDURE — 6370000000 HC RX 637 (ALT 250 FOR IP): Performed by: FAMILY MEDICINE

## 2018-04-09 PROCEDURE — 85027 COMPLETE CBC AUTOMATED: CPT

## 2018-04-09 PROCEDURE — 85018 HEMOGLOBIN: CPT

## 2018-04-09 PROCEDURE — 7100000010 HC PHASE II RECOVERY - FIRST 15 MIN: Performed by: INTERNAL MEDICINE

## 2018-04-09 PROCEDURE — 2580000003 HC RX 258: Performed by: FAMILY MEDICINE

## 2018-04-09 PROCEDURE — 99232 SBSQ HOSP IP/OBS MODERATE 35: CPT | Performed by: FAMILY MEDICINE

## 2018-04-09 PROCEDURE — 85610 PROTHROMBIN TIME: CPT

## 2018-04-09 PROCEDURE — 1200000000 HC SEMI PRIVATE

## 2018-04-09 PROCEDURE — 7100000011 HC PHASE II RECOVERY - ADDTL 15 MIN: Performed by: INTERNAL MEDICINE

## 2018-04-09 PROCEDURE — 0DJD8ZZ INSPECTION OF LOWER INTESTINAL TRACT, VIA NATURAL OR ARTIFICIAL OPENING ENDOSCOPIC: ICD-10-PCS | Performed by: INTERNAL MEDICINE

## 2018-04-09 PROCEDURE — 45330 DIAGNOSTIC SIGMOIDOSCOPY: CPT | Performed by: INTERNAL MEDICINE

## 2018-04-09 PROCEDURE — 3609008400 HC SIGMOIDOSCOPY DIAGNOSTIC: Performed by: INTERNAL MEDICINE

## 2018-04-09 RX ORDER — PROPOFOL 10 MG/ML
INJECTION, EMULSION INTRAVENOUS PRN
Status: DISCONTINUED | OUTPATIENT
Start: 2018-04-09 | End: 2018-04-09 | Stop reason: SDUPTHER

## 2018-04-09 RX ORDER — LIDOCAINE HYDROCHLORIDE 10 MG/ML
INJECTION, SOLUTION EPIDURAL; INFILTRATION; INTRACAUDAL; PERINEURAL PRN
Status: DISCONTINUED | OUTPATIENT
Start: 2018-04-09 | End: 2018-04-09 | Stop reason: SDUPTHER

## 2018-04-09 RX ADMIN — ATORVASTATIN CALCIUM 20 MG: 20 TABLET, FILM COATED ORAL at 08:45

## 2018-04-09 RX ADMIN — FUROSEMIDE 40 MG: 40 TABLET ORAL at 08:45

## 2018-04-09 RX ADMIN — PROPOFOL 100 MG: 10 INJECTION, EMULSION INTRAVENOUS at 12:49

## 2018-04-09 RX ADMIN — CARVEDILOL 6.25 MG: 6.25 TABLET, FILM COATED ORAL at 17:08

## 2018-04-09 RX ADMIN — DOCUSATE SODIUM 100 MG: 100 CAPSULE ORAL at 08:45

## 2018-04-09 RX ADMIN — SODIUM CHLORIDE: 9 INJECTION, SOLUTION INTRAVENOUS at 12:44

## 2018-04-09 RX ADMIN — POLYETHYLENE GLYCOL 3350, SODIUM SULFATE ANHYDROUS, SODIUM BICARBONATE, SODIUM CHLORIDE, POTASSIUM CHLORIDE 4000 ML: 236; 22.74; 6.74; 5.86; 2.97 POWDER, FOR SOLUTION ORAL at 17:08

## 2018-04-09 RX ADMIN — SODIUM CHLORIDE: 9 INJECTION, SOLUTION INTRAVENOUS at 15:11

## 2018-04-09 RX ADMIN — TIMOLOL MALEATE 1 DROP: 5 SOLUTION OPHTHALMIC at 08:45

## 2018-04-09 RX ADMIN — HYDRALAZINE HYDROCHLORIDE 50 MG: 50 TABLET, FILM COATED ORAL at 08:45

## 2018-04-09 RX ADMIN — CARVEDILOL 6.25 MG: 6.25 TABLET, FILM COATED ORAL at 08:45

## 2018-04-09 RX ADMIN — LIDOCAINE HYDROCHLORIDE 50 MG: 10 INJECTION, SOLUTION EPIDURAL; INFILTRATION; INTRACAUDAL; PERINEURAL at 12:49

## 2018-04-09 RX ADMIN — TROSPIUM CHLORIDE 20 MG: 20 TABLET ORAL at 21:42

## 2018-04-09 RX ADMIN — LEVOTHYROXINE SODIUM 25 MCG: 25 TABLET ORAL at 06:34

## 2018-04-09 ASSESSMENT — ENCOUNTER SYMPTOMS
ABDOMINAL PAIN: 0
VOMITING: 0
CONSTIPATION: 0
SHORTNESS OF BREATH: 0
SINUS PRESSURE: 0
SORE THROAT: 0
NAUSEA: 0
VOICE CHANGE: 0
DIARRHEA: 0
SHORTNESS OF BREATH: 0
BLOOD IN STOOL: 0
COUGH: 0
WHEEZING: 0

## 2018-04-09 ASSESSMENT — PAIN SCALES - GENERAL: PAINLEVEL_OUTOF10: 0

## 2018-04-09 ASSESSMENT — PAIN - FUNCTIONAL ASSESSMENT: PAIN_FUNCTIONAL_ASSESSMENT: 0-10

## 2018-04-10 ENCOUNTER — ANESTHESIA EVENT (OUTPATIENT)
Dept: ENDOSCOPY | Age: 68
DRG: 813 | End: 2018-04-10
Payer: MEDICARE

## 2018-04-10 ENCOUNTER — ANESTHESIA (OUTPATIENT)
Dept: ENDOSCOPY | Age: 68
DRG: 813 | End: 2018-04-10
Payer: MEDICARE

## 2018-04-10 VITALS
SYSTOLIC BLOOD PRESSURE: 94 MMHG | OXYGEN SATURATION: 100 % | DIASTOLIC BLOOD PRESSURE: 58 MMHG | RESPIRATION RATE: 9 BRPM

## 2018-04-10 LAB
GLUCOSE BLD-MCNC: 121 MG/DL (ref 75–110)
GLUCOSE BLD-MCNC: 159 MG/DL (ref 75–110)
HEMOGLOBIN: 7.2 G/DL (ref 13–17)
HEMOGLOBIN: 7.5 G/DL (ref 13–17)
HEMOGLOBIN: 7.6 G/DL (ref 13–17)
HEMOGLOBIN: 7.8 G/DL (ref 13–17)

## 2018-04-10 PROCEDURE — 36415 COLL VENOUS BLD VENIPUNCTURE: CPT

## 2018-04-10 PROCEDURE — 45378 DIAGNOSTIC COLONOSCOPY: CPT | Performed by: INTERNAL MEDICINE

## 2018-04-10 PROCEDURE — 99232 SBSQ HOSP IP/OBS MODERATE 35: CPT | Performed by: FAMILY MEDICINE

## 2018-04-10 PROCEDURE — 3609027000 HC COLONOSCOPY: Performed by: INTERNAL MEDICINE

## 2018-04-10 PROCEDURE — 0DJD8ZZ INSPECTION OF LOWER INTESTINAL TRACT, VIA NATURAL OR ARTIFICIAL OPENING ENDOSCOPIC: ICD-10-PCS | Performed by: INTERNAL MEDICINE

## 2018-04-10 PROCEDURE — 85018 HEMOGLOBIN: CPT

## 2018-04-10 PROCEDURE — 82947 ASSAY GLUCOSE BLOOD QUANT: CPT

## 2018-04-10 PROCEDURE — 94762 N-INVAS EAR/PLS OXIMTRY CONT: CPT

## 2018-04-10 PROCEDURE — 3700000000 HC ANESTHESIA ATTENDED CARE: Performed by: INTERNAL MEDICINE

## 2018-04-10 PROCEDURE — 7100000011 HC PHASE II RECOVERY - ADDTL 15 MIN: Performed by: INTERNAL MEDICINE

## 2018-04-10 PROCEDURE — 6370000000 HC RX 637 (ALT 250 FOR IP): Performed by: FAMILY MEDICINE

## 2018-04-10 PROCEDURE — 6360000002 HC RX W HCPCS: Performed by: NURSE ANESTHETIST, CERTIFIED REGISTERED

## 2018-04-10 PROCEDURE — 1200000000 HC SEMI PRIVATE

## 2018-04-10 PROCEDURE — 7100000010 HC PHASE II RECOVERY - FIRST 15 MIN: Performed by: INTERNAL MEDICINE

## 2018-04-10 PROCEDURE — 3700000001 HC ADD 15 MINUTES (ANESTHESIA): Performed by: INTERNAL MEDICINE

## 2018-04-10 PROCEDURE — 2500000003 HC RX 250 WO HCPCS: Performed by: NURSE ANESTHETIST, CERTIFIED REGISTERED

## 2018-04-10 RX ORDER — FERROUS SULFATE 325(65) MG
325 TABLET ORAL
Qty: 60 TABLET | Refills: 6 | Status: SHIPPED | OUTPATIENT
Start: 2018-04-10

## 2018-04-10 RX ORDER — LIDOCAINE HYDROCHLORIDE 10 MG/ML
INJECTION, SOLUTION EPIDURAL; INFILTRATION; INTRACAUDAL; PERINEURAL PRN
Status: DISCONTINUED | OUTPATIENT
Start: 2018-04-10 | End: 2018-04-10 | Stop reason: SDUPTHER

## 2018-04-10 RX ORDER — PROPOFOL 10 MG/ML
INJECTION, EMULSION INTRAVENOUS PRN
Status: DISCONTINUED | OUTPATIENT
Start: 2018-04-10 | End: 2018-04-10 | Stop reason: SDUPTHER

## 2018-04-10 RX ORDER — GLYCOPYRROLATE 1 MG/5 ML
SYRINGE (ML) INTRAVENOUS PRN
Status: DISCONTINUED | OUTPATIENT
Start: 2018-04-10 | End: 2018-04-10 | Stop reason: SDUPTHER

## 2018-04-10 RX ADMIN — FUROSEMIDE 40 MG: 40 TABLET ORAL at 09:01

## 2018-04-10 RX ADMIN — LIDOCAINE HYDROCHLORIDE 50 MG: 10 INJECTION, SOLUTION EPIDURAL; INFILTRATION; INTRACAUDAL; PERINEURAL at 12:43

## 2018-04-10 RX ADMIN — PROPOFOL 20 MG: 10 INJECTION, EMULSION INTRAVENOUS at 13:00

## 2018-04-10 RX ADMIN — CARVEDILOL 6.25 MG: 6.25 TABLET, FILM COATED ORAL at 18:12

## 2018-04-10 RX ADMIN — HYDRALAZINE HYDROCHLORIDE 50 MG: 50 TABLET, FILM COATED ORAL at 09:01

## 2018-04-10 RX ADMIN — PROPOFOL 20 MG: 10 INJECTION, EMULSION INTRAVENOUS at 12:51

## 2018-04-10 RX ADMIN — PROPOFOL 40 MG: 10 INJECTION, EMULSION INTRAVENOUS at 12:45

## 2018-04-10 RX ADMIN — PROPOFOL 20 MG: 10 INJECTION, EMULSION INTRAVENOUS at 13:06

## 2018-04-10 RX ADMIN — LEVOTHYROXINE SODIUM 25 MCG: 25 TABLET ORAL at 06:50

## 2018-04-10 RX ADMIN — PROPOFOL 20 MG: 10 INJECTION, EMULSION INTRAVENOUS at 13:09

## 2018-04-10 RX ADMIN — PROPOFOL 20 MG: 10 INJECTION, EMULSION INTRAVENOUS at 12:53

## 2018-04-10 RX ADMIN — PROPOFOL 20 MG: 10 INJECTION, EMULSION INTRAVENOUS at 13:15

## 2018-04-10 RX ADMIN — PROPOFOL 20 MG: 10 INJECTION, EMULSION INTRAVENOUS at 13:18

## 2018-04-10 RX ADMIN — TIMOLOL MALEATE 1 DROP: 5 SOLUTION OPHTHALMIC at 09:02

## 2018-04-10 RX ADMIN — PROPOFOL 20 MG: 10 INJECTION, EMULSION INTRAVENOUS at 13:12

## 2018-04-10 RX ADMIN — CARVEDILOL 6.25 MG: 6.25 TABLET, FILM COATED ORAL at 09:01

## 2018-04-10 RX ADMIN — TROSPIUM CHLORIDE 20 MG: 20 TABLET ORAL at 09:01

## 2018-04-10 RX ADMIN — PROPOFOL 80 MG: 10 INJECTION, EMULSION INTRAVENOUS at 12:43

## 2018-04-10 RX ADMIN — RIVAROXABAN 20 MG: 20 TABLET, FILM COATED ORAL at 18:12

## 2018-04-10 RX ADMIN — PROPOFOL 40 MG: 10 INJECTION, EMULSION INTRAVENOUS at 12:48

## 2018-04-10 RX ADMIN — Medication 0.2 MG: at 12:58

## 2018-04-10 RX ADMIN — PROPOFOL 20 MG: 10 INJECTION, EMULSION INTRAVENOUS at 13:03

## 2018-04-10 RX ADMIN — TROSPIUM CHLORIDE 20 MG: 20 TABLET ORAL at 21:15

## 2018-04-10 ASSESSMENT — ENCOUNTER SYMPTOMS
ABDOMINAL PAIN: 0
COUGH: 0
VOICE CHANGE: 0
SHORTNESS OF BREATH: 0
BLOOD IN STOOL: 0
WHEEZING: 0
VOMITING: 0
CONSTIPATION: 0
SORE THROAT: 0
DIARRHEA: 0
NAUSEA: 0
SINUS PRESSURE: 0
SHORTNESS OF BREATH: 0

## 2018-04-11 VITALS
BODY MASS INDEX: 30.8 KG/M2 | RESPIRATION RATE: 16 BRPM | TEMPERATURE: 98.4 F | HEART RATE: 81 BPM | SYSTOLIC BLOOD PRESSURE: 143 MMHG | WEIGHT: 220 LBS | OXYGEN SATURATION: 99 % | HEIGHT: 71 IN | DIASTOLIC BLOOD PRESSURE: 86 MMHG

## 2018-04-11 LAB
ABO/RH: NORMAL
ANTIBODY SCREEN: NEGATIVE
ARM BAND NUMBER: NORMAL
BLD PROD TYP BPU: NORMAL
CROSSMATCH RESULT: NORMAL
DISPENSE STATUS BLOOD BANK: NORMAL
EXPIRATION DATE: NORMAL
HEMOGLOBIN: 7.1 G/DL (ref 13–17)
HEMOGLOBIN: 7.2 G/DL (ref 13–17)
HEMOGLOBIN: 7.2 G/DL (ref 13–17)
TRANSFUSION STATUS: NORMAL
UNIT DIVISION: 0
UNIT NUMBER: NORMAL

## 2018-04-11 PROCEDURE — 6370000000 HC RX 637 (ALT 250 FOR IP): Performed by: FAMILY MEDICINE

## 2018-04-11 PROCEDURE — 99232 SBSQ HOSP IP/OBS MODERATE 35: CPT | Performed by: FAMILY MEDICINE

## 2018-04-11 PROCEDURE — 85018 HEMOGLOBIN: CPT

## 2018-04-11 PROCEDURE — 36415 COLL VENOUS BLD VENIPUNCTURE: CPT

## 2018-04-11 RX ADMIN — CARVEDILOL 6.25 MG: 6.25 TABLET, FILM COATED ORAL at 08:46

## 2018-04-11 RX ADMIN — DOCUSATE SODIUM 100 MG: 100 CAPSULE ORAL at 08:46

## 2018-04-11 RX ADMIN — ATORVASTATIN CALCIUM 20 MG: 20 TABLET, FILM COATED ORAL at 08:46

## 2018-04-11 RX ADMIN — LEVOTHYROXINE SODIUM 25 MCG: 25 TABLET ORAL at 06:06

## 2018-04-11 RX ADMIN — HYDRALAZINE HYDROCHLORIDE 50 MG: 50 TABLET, FILM COATED ORAL at 08:46

## 2018-04-11 RX ADMIN — TROSPIUM CHLORIDE 20 MG: 20 TABLET ORAL at 08:46

## 2018-04-11 RX ADMIN — TIMOLOL MALEATE 1 DROP: 5 SOLUTION OPHTHALMIC at 08:46

## 2018-04-11 RX ADMIN — FUROSEMIDE 40 MG: 40 TABLET ORAL at 08:46

## 2018-04-11 ASSESSMENT — ENCOUNTER SYMPTOMS
DIARRHEA: 0
SORE THROAT: 0
CONSTIPATION: 0
SINUS PRESSURE: 0
WHEEZING: 0
VOMITING: 0
COUGH: 0
VOICE CHANGE: 0
NAUSEA: 0
ABDOMINAL PAIN: 0
SHORTNESS OF BREATH: 0
BLOOD IN STOOL: 0

## 2020-12-04 RX ORDER — OXYBUTYNIN CHLORIDE 5 MG/1
TABLET ORAL
Qty: 90 TABLET | Refills: 2 | OUTPATIENT
Start: 2020-12-04

## 2020-12-04 RX ORDER — TAMSULOSIN HYDROCHLORIDE 0.4 MG/1
CAPSULE ORAL
Qty: 90 CAPSULE | Refills: 2 | OUTPATIENT
Start: 2020-12-04

## 2020-12-07 RX ORDER — TAMSULOSIN HYDROCHLORIDE 0.4 MG/1
CAPSULE ORAL
Qty: 90 CAPSULE | Refills: 2 | OUTPATIENT
Start: 2020-12-07

## 2020-12-09 RX ORDER — TAMSULOSIN HYDROCHLORIDE 0.4 MG/1
CAPSULE ORAL
Qty: 90 CAPSULE | Refills: 2 | OUTPATIENT
Start: 2020-12-09

## 2020-12-28 RX ORDER — TAMSULOSIN HYDROCHLORIDE 0.4 MG/1
CAPSULE ORAL
Qty: 16 CAPSULE | Refills: 2 | OUTPATIENT
Start: 2020-12-28

## 2020-12-29 RX ORDER — OXYBUTYNIN CHLORIDE 5 MG/1
TABLET ORAL
Qty: 90 TABLET | Refills: 2 | OUTPATIENT
Start: 2020-12-29

## 2020-12-30 RX ORDER — OXYBUTYNIN CHLORIDE 5 MG/1
TABLET ORAL
Qty: 30 TABLET | Refills: 0 | OUTPATIENT
Start: 2020-12-30

## 2021-01-04 RX ORDER — TAMSULOSIN HYDROCHLORIDE 0.4 MG/1
CAPSULE ORAL
Qty: 16 CAPSULE | Refills: 2 | OUTPATIENT
Start: 2021-01-04

## 2021-02-01 RX ORDER — OXYBUTYNIN CHLORIDE 5 MG/1
TABLET ORAL
Qty: 30 TABLET | Refills: 0 | OUTPATIENT
Start: 2021-02-01

## 2022-07-11 RX ORDER — FINASTERIDE 5 MG/1
TABLET, FILM COATED ORAL
Qty: 52 TABLET | OUTPATIENT
Start: 2022-07-11

## 2022-09-02 RX ORDER — MIRABEGRON 50 MG/1
TABLET, FILM COATED, EXTENDED RELEASE ORAL
Qty: 30 TABLET | OUTPATIENT
Start: 2022-09-02

## 2022-10-10 RX ORDER — FINASTERIDE 5 MG/1
TABLET, FILM COATED ORAL
Qty: 52 TABLET | OUTPATIENT
Start: 2022-10-10

## 2022-12-21 RX ORDER — MIRABEGRON 50 MG/1
TABLET, FILM COATED, EXTENDED RELEASE ORAL
Qty: 30 TABLET | OUTPATIENT
Start: 2022-12-21

## 2022-12-23 RX ORDER — MIRABEGRON 50 MG/1
TABLET, FILM COATED, EXTENDED RELEASE ORAL
Qty: 30 TABLET | OUTPATIENT
Start: 2022-12-23

## 2023-01-20 RX ORDER — MIRABEGRON 50 MG/1
TABLET, FILM COATED, EXTENDED RELEASE ORAL
Qty: 30 TABLET | OUTPATIENT
Start: 2023-01-20

## 2023-01-26 RX ORDER — MIRABEGRON 50 MG/1
TABLET, FILM COATED, EXTENDED RELEASE ORAL
Qty: 30 TABLET | OUTPATIENT
Start: 2023-01-26

## 2024-01-10 ENCOUNTER — HOSPITAL ENCOUNTER (OUTPATIENT)
Age: 74
Setting detail: SPECIMEN
Discharge: HOME | End: 2024-01-10

## 2024-01-10 LAB
INR PPP: 1.5
PROTHROMBIN TIME: 17.9 SEC (ref 11.5–14.2)

## 2024-01-10 PROCEDURE — 36415 COLL VENOUS BLD VENIPUNCTURE: CPT

## 2024-01-10 PROCEDURE — 85610 PROTHROMBIN TIME: CPT

## 2024-01-24 ENCOUNTER — HOSPITAL ENCOUNTER (OUTPATIENT)
Age: 74
Setting detail: SPECIMEN
Discharge: HOME | End: 2024-01-24

## 2024-01-24 LAB
BACTERIA URNS QL MICRO: ABNORMAL
BILIRUB UR QL STRIP: NEGATIVE
CLARITY UR: ABNORMAL
COLOR UR: YELLOW
CRYSTALS URNS MICRO: ABNORMAL /HPF
EPI CELLS #/AREA URNS HPF: ABNORMAL /HPF (ref 0–5)
GLUCOSE UR STRIP-MCNC: NEGATIVE MG/DL
HGB UR QL STRIP.AUTO: ABNORMAL
KETONES UR STRIP-MCNC: NEGATIVE MG/DL
LEUKOCYTE ESTERASE UR QL STRIP: ABNORMAL
NITRITE UR QL STRIP: NEGATIVE
PH UR STRIP: 6 [PH] (ref 5–8)
PROT UR STRIP-MCNC: ABNORMAL MG/DL
RBC #/AREA URNS HPF: ABNORMAL /HPF (ref 0–2)
SP GR UR STRIP: 1.02 (ref 1–1.03)
UROBILINOGEN UR STRIP-ACNC: NORMAL EU/DL (ref 0–1)
WBC #/AREA URNS HPF: ABNORMAL /HPF (ref 0–5)

## 2024-01-24 PROCEDURE — 81001 URINALYSIS AUTO W/SCOPE: CPT

## 2024-01-24 PROCEDURE — 87186 SC STD MICRODIL/AGAR DIL: CPT

## 2024-01-24 PROCEDURE — 87086 URINE CULTURE/COLONY COUNT: CPT

## 2024-01-24 PROCEDURE — 87077 CULTURE AEROBIC IDENTIFY: CPT

## 2024-01-24 PROCEDURE — 87184 SC STD DISK METHOD PER PLATE: CPT

## 2024-01-26 ENCOUNTER — HOSPITAL ENCOUNTER (OUTPATIENT)
Age: 74
Setting detail: SPECIMEN
Discharge: HOME | End: 2024-01-26

## 2024-01-26 LAB
ANION GAP SERPL CALCULATED.3IONS-SCNC: 12 MMOL/L (ref 9–17)
BACTERIA URNS QL MICRO: ABNORMAL
BILIRUB UR QL STRIP: NEGATIVE
BUN SERPL-MCNC: 19 MG/DL (ref 8–23)
BUN/CREAT SERPL: 24 (ref 9–20)
CALCIUM SERPL-MCNC: 9.1 MG/DL (ref 8.6–10.4)
CHLORIDE SERPL-SCNC: 104 MMOL/L (ref 98–107)
CLARITY UR: ABNORMAL
CO2 SERPL-SCNC: 25 MMOL/L (ref 20–31)
COLOR UR: YELLOW
CREAT SERPL-MCNC: 0.8 MG/DL (ref 0.7–1.2)
CRYSTALS URNS MICRO: ABNORMAL /HPF
CRYSTALS URNS MICRO: ABNORMAL /HPF
EPI CELLS #/AREA URNS HPF: ABNORMAL /HPF (ref 0–5)
GFR SERPL CREATININE-BSD FRML MDRD: >60 ML/MIN/1.73M2
GLUCOSE SERPL-MCNC: 103 MG/DL (ref 70–99)
GLUCOSE UR STRIP-MCNC: NEGATIVE MG/DL
HGB UR QL STRIP.AUTO: ABNORMAL
KETONES UR STRIP-MCNC: NEGATIVE MG/DL
LEUKOCYTE ESTERASE UR QL STRIP: ABNORMAL
MUCOUS THREADS URNS QL MICRO: ABNORMAL
NITRITE UR QL STRIP: NEGATIVE
PH UR STRIP: 5.5 [PH] (ref 5–8)
POTASSIUM SERPL-SCNC: 3.9 MMOL/L (ref 3.7–5.3)
PROT UR STRIP-MCNC: ABNORMAL MG/DL
RBC #/AREA URNS HPF: ABNORMAL /HPF (ref 0–2)
SODIUM SERPL-SCNC: 141 MMOL/L (ref 135–144)
SP GR UR STRIP: 1.02 (ref 1–1.03)
UROBILINOGEN UR STRIP-ACNC: NORMAL EU/DL (ref 0–1)
WBC #/AREA URNS HPF: ABNORMAL /HPF (ref 0–5)

## 2024-01-26 PROCEDURE — 87184 SC STD DISK METHOD PER PLATE: CPT

## 2024-01-26 PROCEDURE — 80048 BASIC METABOLIC PNL TOTAL CA: CPT

## 2024-01-26 PROCEDURE — P9603 ONE-WAY ALLOW PRORATED MILES: HCPCS

## 2024-01-26 PROCEDURE — 36415 COLL VENOUS BLD VENIPUNCTURE: CPT

## 2024-01-26 PROCEDURE — 87077 CULTURE AEROBIC IDENTIFY: CPT

## 2024-01-26 PROCEDURE — 87186 SC STD MICRODIL/AGAR DIL: CPT

## 2024-01-26 PROCEDURE — 87086 URINE CULTURE/COLONY COUNT: CPT

## 2024-01-26 PROCEDURE — 81001 URINALYSIS AUTO W/SCOPE: CPT

## 2024-01-28 LAB
MICROORGANISM SPEC CULT: ABNORMAL
SERVICE CMNT-IMP: ABNORMAL
SERVICE CMNT-IMP: ABNORMAL
SPECIMEN DESCRIPTION: ABNORMAL
SPECIMEN DESCRIPTION: ABNORMAL

## 2024-01-29 ENCOUNTER — HOSPITAL ENCOUNTER (OUTPATIENT)
Age: 74
Setting detail: SPECIMEN
Discharge: HOME | End: 2024-01-29

## 2024-01-29 LAB
ALBUMIN SERPL-MCNC: 3.4 G/DL (ref 3.5–5.2)
ALP SERPL-CCNC: 102 U/L (ref 40–129)
ALT SERPL-CCNC: 14 U/L (ref 5–41)
ANION GAP SERPL CALCULATED.3IONS-SCNC: 12 MMOL/L (ref 9–17)
AST SERPL-CCNC: 19 U/L
BILIRUB SERPL-MCNC: 0.9 MG/DL (ref 0.3–1.2)
BUN SERPL-MCNC: 20 MG/DL (ref 8–23)
BUN/CREAT SERPL: 25 (ref 9–20)
CALCIUM SERPL-MCNC: 9.1 MG/DL (ref 8.6–10.4)
CHLORIDE SERPL-SCNC: 105 MMOL/L (ref 98–107)
CO2 SERPL-SCNC: 23 MMOL/L (ref 20–31)
CREAT SERPL-MCNC: 0.8 MG/DL (ref 0.7–1.2)
GFR SERPL CREATININE-BSD FRML MDRD: >60 ML/MIN/1.73M2
GLUCOSE SERPL-MCNC: 106 MG/DL (ref 70–99)
POTASSIUM SERPL-SCNC: 3.7 MMOL/L (ref 3.7–5.3)
PROT SERPL-MCNC: 6.9 G/DL (ref 6.4–8.3)
SODIUM SERPL-SCNC: 140 MMOL/L (ref 135–144)

## 2024-01-29 PROCEDURE — P9603 ONE-WAY ALLOW PRORATED MILES: HCPCS

## 2024-01-29 PROCEDURE — 36415 COLL VENOUS BLD VENIPUNCTURE: CPT

## 2024-01-29 PROCEDURE — 80053 COMPREHEN METABOLIC PANEL: CPT

## 2024-08-19 ENCOUNTER — HOSPITAL ENCOUNTER (OUTPATIENT)
Age: 74
Setting detail: SPECIMEN
Discharge: HOME | End: 2024-08-19

## 2024-08-19 LAB
BACTERIA URNS QL MICRO: NORMAL
BILIRUB UR QL STRIP: NEGATIVE
CASTS #/AREA URNS LPF: NORMAL /LPF (ref 0–8)
CLARITY UR: CLEAR
COLOR UR: YELLOW
EPI CELLS #/AREA URNS HPF: NORMAL /HPF (ref 0–5)
GLUCOSE UR STRIP-MCNC: NEGATIVE MG/DL
HGB UR QL STRIP.AUTO: ABNORMAL
KETONES UR STRIP-MCNC: NEGATIVE MG/DL
LEUKOCYTE ESTERASE UR QL STRIP: ABNORMAL
NITRITE UR QL STRIP: NEGATIVE
PH UR STRIP: 5.5 [PH] (ref 5–8)
PROT UR STRIP-MCNC: NEGATIVE MG/DL
RBC #/AREA URNS HPF: NORMAL /HPF (ref 0–4)
SP GR UR STRIP: 1.01 (ref 1–1.03)
UROBILINOGEN UR STRIP-ACNC: NORMAL EU/DL (ref 0–1)
WBC #/AREA URNS HPF: NORMAL /HPF (ref 0–5)

## 2024-08-20 LAB
MICROORGANISM SPEC CULT: NORMAL
SPECIMEN DESCRIPTION: NORMAL

## 2024-12-09 ENCOUNTER — HOSPITAL ENCOUNTER (OUTPATIENT)
Age: 74
Setting detail: SPECIMEN
Discharge: HOME | End: 2024-12-09
Payer: MEDICARE

## 2024-12-09 PROCEDURE — 81001 URINALYSIS AUTO W/SCOPE: CPT

## 2024-12-09 PROCEDURE — 87086 URINE CULTURE/COLONY COUNT: CPT

## 2024-12-10 LAB
BILIRUB UR QL STRIP: NEGATIVE
CASTS #/AREA URNS LPF: ABNORMAL /LPF
CASTS #/AREA URNS LPF: ABNORMAL /LPF
CLARITY UR: CLEAR
COLOR UR: YELLOW
CRYSTALS URNS MICRO: ABNORMAL /HPF
EPI CELLS #/AREA URNS HPF: ABNORMAL /HPF (ref 0–5)
GLUCOSE UR STRIP-MCNC: NEGATIVE MG/DL
HGB UR QL STRIP.AUTO: ABNORMAL
KETONES UR STRIP-MCNC: ABNORMAL MG/DL
LEUKOCYTE ESTERASE UR QL STRIP: ABNORMAL
NITRITE UR QL STRIP: NEGATIVE
PH UR STRIP: 5.5 [PH] (ref 5–8)
PROT UR STRIP-MCNC: ABNORMAL MG/DL
RBC #/AREA URNS HPF: ABNORMAL /HPF (ref 0–2)
SP GR UR STRIP: 1.03 (ref 1–1.03)
UROBILINOGEN UR STRIP-ACNC: NORMAL EU/DL (ref 0–1)
WBC #/AREA URNS HPF: ABNORMAL /HPF (ref 0–5)
YEAST URNS QL MICRO: ABNORMAL

## 2024-12-12 LAB
MICROORGANISM SPEC CULT: NORMAL
SPECIMEN DESCRIPTION: NORMAL

## 2024-12-24 ENCOUNTER — HOSPITAL ENCOUNTER (OUTPATIENT)
Age: 74
Setting detail: SPECIMEN
Discharge: HOME | End: 2024-12-24

## 2024-12-24 LAB
HCT VFR BLD AUTO: 34.4 % (ref 40.7–50.3)
HGB BLD-MCNC: 11.3 G/DL (ref 13–17)

## 2024-12-24 PROCEDURE — 85014 HEMATOCRIT: CPT

## 2024-12-24 PROCEDURE — P9603 ONE-WAY ALLOW PRORATED MILES: HCPCS

## 2024-12-24 PROCEDURE — 36415 COLL VENOUS BLD VENIPUNCTURE: CPT

## 2024-12-24 PROCEDURE — 85018 HEMOGLOBIN: CPT

## 2024-12-29 ENCOUNTER — HOSPITAL ENCOUNTER (OUTPATIENT)
Age: 74
Setting detail: SPECIMEN
Discharge: HOME | End: 2024-12-29

## 2024-12-29 PROCEDURE — 87040 BLOOD CULTURE FOR BACTERIA: CPT

## 2024-12-29 PROCEDURE — 36415 COLL VENOUS BLD VENIPUNCTURE: CPT

## 2024-12-30 ENCOUNTER — HOSPITAL ENCOUNTER (OUTPATIENT)
Age: 74
Setting detail: SPECIMEN
Discharge: HOME | End: 2024-12-30

## 2024-12-30 LAB
25(OH)D3 SERPL-MCNC: 36.2 NG/ML (ref 30–100)
ALBUMIN SERPL-MCNC: 3.5 G/DL (ref 3.5–5.2)
ALBUMIN/GLOB SERPL: 1.3 {RATIO} (ref 1–2.5)
ALP SERPL-CCNC: 114 U/L (ref 40–129)
ALT SERPL-CCNC: 10 U/L (ref 10–50)
ANION GAP SERPL CALCULATED.3IONS-SCNC: 12 MMOL/L (ref 9–16)
AST SERPL-CCNC: 18 U/L (ref 10–50)
BILIRUB SERPL-MCNC: 0.6 MG/DL (ref 0–1.2)
BUN SERPL-MCNC: 38 MG/DL (ref 8–23)
CALCIUM SERPL-MCNC: 9.5 MG/DL (ref 8.8–10.2)
CHLORIDE SERPL-SCNC: 109 MMOL/L (ref 98–107)
CO2 SERPL-SCNC: 19 MMOL/L (ref 20–31)
CREAT SERPL-MCNC: 1.3 MG/DL (ref 0.7–1.2)
EST. AVERAGE GLUCOSE BLD GHB EST-MCNC: 117 MG/DL
GFR, ESTIMATED: 60 ML/MIN/1.73M2
GLUCOSE SERPL-MCNC: 124 MG/DL (ref 82–115)
HBA1C MFR BLD: 5.7 % (ref 4–6)
MAGNESIUM SERPL-MCNC: 1.5 MG/DL (ref 1.6–2.4)
POTASSIUM SERPL-SCNC: 4.3 MMOL/L (ref 3.7–5.3)
PROT SERPL-MCNC: 6.3 G/DL (ref 6.6–8.7)
SODIUM SERPL-SCNC: 139 MMOL/L (ref 136–145)
T4 FREE SERPL-MCNC: 1.3 NG/DL (ref 0.93–1.7)
TSH SERPL DL<=0.05 MIU/L-ACNC: 2.92 UIU/ML (ref 0.27–4.2)
URATE SERPL-MCNC: 2.1 MG/DL (ref 3.4–7)
VIT B12 SERPL-MCNC: 429 PG/ML (ref 232–1245)

## 2024-12-30 PROCEDURE — 82607 VITAMIN B-12: CPT

## 2024-12-30 PROCEDURE — 83735 ASSAY OF MAGNESIUM: CPT

## 2024-12-30 PROCEDURE — 36415 COLL VENOUS BLD VENIPUNCTURE: CPT

## 2024-12-30 PROCEDURE — 83036 HEMOGLOBIN GLYCOSYLATED A1C: CPT

## 2024-12-30 PROCEDURE — 80053 COMPREHEN METABOLIC PANEL: CPT

## 2024-12-30 PROCEDURE — 84550 ASSAY OF BLOOD/URIC ACID: CPT

## 2024-12-30 PROCEDURE — 82306 VITAMIN D 25 HYDROXY: CPT

## 2024-12-30 PROCEDURE — 84439 ASSAY OF FREE THYROXINE: CPT

## 2024-12-30 PROCEDURE — P9603 ONE-WAY ALLOW PRORATED MILES: HCPCS

## 2024-12-30 PROCEDURE — 84443 ASSAY THYROID STIM HORMONE: CPT

## 2025-01-03 LAB
MICROORGANISM SPEC CULT: NORMAL
MICROORGANISM SPEC CULT: NORMAL
SERVICE CMNT-IMP: NORMAL
SERVICE CMNT-IMP: NORMAL
SPECIMEN DESCRIPTION: NORMAL
SPECIMEN DESCRIPTION: NORMAL

## 2025-01-07 ENCOUNTER — HOSPITAL ENCOUNTER (OUTPATIENT)
Age: 75
Setting detail: SPECIMEN
Discharge: HOME | End: 2025-01-07

## 2025-01-07 LAB
ANION GAP SERPL CALCULATED.3IONS-SCNC: 12 MMOL/L (ref 9–16)
BUN SERPL-MCNC: 39 MG/DL (ref 8–23)
CALCIUM SERPL-MCNC: 9.6 MG/DL (ref 8.8–10.2)
CHLORIDE SERPL-SCNC: 104 MMOL/L (ref 98–107)
CO2 SERPL-SCNC: 20 MMOL/L (ref 20–31)
CREAT SERPL-MCNC: 1.2 MG/DL (ref 0.7–1.2)
GFR, ESTIMATED: 62 ML/MIN/1.73M2
GLUCOSE SERPL-MCNC: 198 MG/DL (ref 82–115)
MAGNESIUM SERPL-MCNC: 2 MG/DL (ref 1.6–2.4)
POTASSIUM SERPL-SCNC: 5 MMOL/L (ref 3.7–5.3)
SODIUM SERPL-SCNC: 136 MMOL/L (ref 136–145)

## 2025-01-07 PROCEDURE — P9603 ONE-WAY ALLOW PRORATED MILES: HCPCS

## 2025-01-07 PROCEDURE — 36415 COLL VENOUS BLD VENIPUNCTURE: CPT

## 2025-01-07 PROCEDURE — 80048 BASIC METABOLIC PNL TOTAL CA: CPT

## 2025-01-07 PROCEDURE — 83735 ASSAY OF MAGNESIUM: CPT

## 2025-01-10 ENCOUNTER — HOSPITAL ENCOUNTER (OUTPATIENT)
Age: 75
Setting detail: SPECIMEN
Discharge: HOME | End: 2025-01-10

## 2025-01-10 LAB
INR PPP: 4.6
PROTHROMBIN TIME: 42.4 SEC (ref 11.5–14.2)

## 2025-01-10 PROCEDURE — 36415 COLL VENOUS BLD VENIPUNCTURE: CPT

## 2025-01-10 PROCEDURE — 85610 PROTHROMBIN TIME: CPT

## 2025-01-11 ENCOUNTER — HOSPITAL ENCOUNTER (OUTPATIENT)
Age: 75
Setting detail: SPECIMEN
Discharge: HOME | End: 2025-01-11

## 2025-01-11 LAB
ALBUMIN SERPL-MCNC: 2.9 G/DL (ref 3.5–5.2)
ALBUMIN/GLOB SERPL: 1 {RATIO} (ref 1–2.5)
ALP SERPL-CCNC: 76 U/L (ref 40–129)
ALT SERPL-CCNC: 35 U/L (ref 10–50)
ANION GAP SERPL CALCULATED.3IONS-SCNC: 11 MMOL/L (ref 9–16)
AST SERPL-CCNC: 64 U/L (ref 10–50)
BACTERIA URNS QL MICRO: ABNORMAL
BILIRUB SERPL-MCNC: 0.4 MG/DL (ref 0–1.2)
BILIRUB UR QL STRIP: NEGATIVE
BUN SERPL-MCNC: 72 MG/DL (ref 8–23)
CALCIUM SERPL-MCNC: 8.9 MG/DL (ref 8.8–10.2)
CASTS #/AREA URNS LPF: ABNORMAL /LPF
CASTS #/AREA URNS LPF: ABNORMAL /LPF
CHLORIDE SERPL-SCNC: 100 MMOL/L (ref 98–107)
CLARITY UR: ABNORMAL
CO2 SERPL-SCNC: 22 MMOL/L (ref 20–31)
COLOR UR: YELLOW
CREAT SERPL-MCNC: 1.4 MG/DL (ref 0.7–1.2)
EPI CELLS #/AREA URNS HPF: ABNORMAL /HPF (ref 0–5)
ERYTHROCYTE [DISTWIDTH] IN BLOOD BY AUTOMATED COUNT: 19.7 % (ref 11.8–14.4)
GFR, ESTIMATED: 52 ML/MIN/1.73M2
GLUCOSE SERPL-MCNC: 203 MG/DL (ref 82–115)
GLUCOSE UR STRIP-MCNC: NEGATIVE MG/DL
HCT VFR BLD AUTO: 29.6 % (ref 40.7–50.3)
HGB BLD-MCNC: 10 G/DL (ref 13–17)
HGB UR QL STRIP.AUTO: ABNORMAL
KETONES UR STRIP-MCNC: NEGATIVE MG/DL
LEUKOCYTE ESTERASE UR QL STRIP: ABNORMAL
MCH RBC QN AUTO: 28.2 PG (ref 25.2–33.5)
MCHC RBC AUTO-ENTMCNC: 33.8 G/DL (ref 28.4–34.8)
MCV RBC AUTO: 83.6 FL (ref 82.6–102.9)
NITRITE UR QL STRIP: POSITIVE
NRBC BLD-RTO: 0 PER 100 WBC
PH UR STRIP: 5.5 [PH] (ref 5–8)
PLATELET # BLD AUTO: 125 K/UL (ref 138–453)
PMV BLD AUTO: ABNORMAL FL (ref 8.1–13.5)
POTASSIUM SERPL-SCNC: 4.9 MMOL/L (ref 3.7–5.3)
PROT SERPL-MCNC: 5.9 G/DL (ref 6.6–8.7)
PROT UR STRIP-MCNC: ABNORMAL MG/DL
RBC # BLD AUTO: 3.54 M/UL (ref 4.21–5.77)
RBC #/AREA URNS HPF: ABNORMAL /HPF (ref 0–2)
SODIUM SERPL-SCNC: 134 MMOL/L (ref 136–145)
SP GR UR STRIP: 1.01 (ref 1–1.03)
UROBILINOGEN UR STRIP-ACNC: NORMAL EU/DL (ref 0–1)
WBC #/AREA URNS HPF: ABNORMAL /HPF (ref 0–5)
WBC OTHER # BLD: 7.9 K/UL (ref 3.5–11.3)
YEAST URNS QL MICRO: ABNORMAL

## 2025-01-11 PROCEDURE — 36415 COLL VENOUS BLD VENIPUNCTURE: CPT

## 2025-01-11 PROCEDURE — P9603 ONE-WAY ALLOW PRORATED MILES: HCPCS

## 2025-01-11 PROCEDURE — 81001 URINALYSIS AUTO W/SCOPE: CPT

## 2025-01-11 PROCEDURE — 80053 COMPREHEN METABOLIC PANEL: CPT

## 2025-01-11 PROCEDURE — 85027 COMPLETE CBC AUTOMATED: CPT

## 2025-01-11 PROCEDURE — 87086 URINE CULTURE/COLONY COUNT: CPT

## 2025-01-12 LAB
MICROORGANISM SPEC CULT: ABNORMAL
MICROORGANISM SPEC CULT: ABNORMAL
SERVICE CMNT-IMP: ABNORMAL
SPECIMEN DESCRIPTION: ABNORMAL

## 2025-01-14 ENCOUNTER — HOSPITAL ENCOUNTER (OUTPATIENT)
Age: 75
Setting detail: SPECIMEN
Discharge: HOME | End: 2025-01-14

## 2025-01-14 LAB
ANION GAP SERPL CALCULATED.3IONS-SCNC: 10 MMOL/L (ref 9–16)
ANION GAP SERPL CALCULATED.3IONS-SCNC: 11 MMOL/L (ref 9–16)
BASOPHILS # BLD: 0.03 K/UL (ref 0–0.2)
BASOPHILS NFR BLD: 0 % (ref 0–2)
BUN SERPL-MCNC: 32 MG/DL (ref 8–23)
BUN SERPL-MCNC: 34 MG/DL (ref 8–23)
CALCIUM SERPL-MCNC: 8.7 MG/DL (ref 8.8–10.2)
CALCIUM SERPL-MCNC: 8.8 MG/DL (ref 8.8–10.2)
CHLORIDE SERPL-SCNC: 107 MMOL/L (ref 98–107)
CHLORIDE SERPL-SCNC: 108 MMOL/L (ref 98–107)
CO2 SERPL-SCNC: 21 MMOL/L (ref 20–31)
CO2 SERPL-SCNC: 21 MMOL/L (ref 20–31)
CREAT SERPL-MCNC: 0.8 MG/DL (ref 0.7–1.2)
CREAT SERPL-MCNC: 0.9 MG/DL (ref 0.7–1.2)
EOSINOPHIL # BLD: 0.03 K/UL (ref 0–0.44)
EOSINOPHILS RELATIVE PERCENT: 0 % (ref 1–4)
ERYTHROCYTE [DISTWIDTH] IN BLOOD BY AUTOMATED COUNT: 20 % (ref 11.8–14.4)
GFR, ESTIMATED: >90 ML/MIN/1.73M2
GFR, ESTIMATED: >90 ML/MIN/1.73M2
GLUCOSE SERPL-MCNC: 125 MG/DL (ref 82–115)
GLUCOSE SERPL-MCNC: 129 MG/DL (ref 82–115)
HCT VFR BLD AUTO: 34 % (ref 40.7–50.3)
HGB BLD-MCNC: 11.2 G/DL (ref 13–17)
IMM GRANULOCYTES # BLD AUTO: 0.1 K/UL (ref 0–0.3)
IMM GRANULOCYTES NFR BLD: 1 %
LYMPHOCYTES NFR BLD: 0.92 K/UL (ref 1.1–3.7)
LYMPHOCYTES RELATIVE PERCENT: 11 % (ref 24–43)
MCH RBC QN AUTO: 27.8 PG (ref 25.2–33.5)
MCHC RBC AUTO-ENTMCNC: 32.9 G/DL (ref 28.4–34.8)
MCV RBC AUTO: 84.4 FL (ref 82.6–102.9)
MONOCYTES NFR BLD: 0.6 K/UL (ref 0.1–1.2)
MONOCYTES NFR BLD: 7 % (ref 3–12)
NEUTROPHILS NFR BLD: 80 % (ref 36–65)
NEUTS SEG NFR BLD: 6.77 K/UL (ref 1.5–8.1)
NRBC BLD-RTO: 0 PER 100 WBC
PLATELET # BLD AUTO: 143 K/UL (ref 138–453)
PMV BLD AUTO: ABNORMAL FL (ref 8.1–13.5)
POTASSIUM SERPL-SCNC: 4.8 MMOL/L (ref 3.7–5.3)
POTASSIUM SERPL-SCNC: 5.1 MMOL/L (ref 3.7–5.3)
PROCALCITONIN SERPL-MCNC: 0.31 NG/ML (ref 0–0.09)
RBC # BLD AUTO: 4.03 M/UL (ref 4.21–5.77)
RBC # BLD: ABNORMAL 10*6/UL
SODIUM SERPL-SCNC: 139 MMOL/L (ref 136–145)
SODIUM SERPL-SCNC: 139 MMOL/L (ref 136–145)
WBC OTHER # BLD: 8.5 K/UL (ref 3.5–11.3)

## 2025-01-14 PROCEDURE — P9603 ONE-WAY ALLOW PRORATED MILES: HCPCS

## 2025-01-14 PROCEDURE — 84145 PROCALCITONIN (PCT): CPT

## 2025-01-14 PROCEDURE — 80048 BASIC METABOLIC PNL TOTAL CA: CPT

## 2025-01-14 PROCEDURE — 36415 COLL VENOUS BLD VENIPUNCTURE: CPT

## 2025-01-14 PROCEDURE — 85025 COMPLETE CBC W/AUTO DIFF WBC: CPT

## 2025-01-16 ENCOUNTER — HOSPITAL ENCOUNTER (OUTPATIENT)
Age: 75
Setting detail: SPECIMEN
Discharge: HOME | End: 2025-01-16

## 2025-01-16 LAB
INR PPP: 5.3
PROTHROMBIN TIME: 47.7 SEC (ref 11.5–14.2)

## 2025-01-16 PROCEDURE — 85610 PROTHROMBIN TIME: CPT

## 2025-01-16 PROCEDURE — 36415 COLL VENOUS BLD VENIPUNCTURE: CPT

## 2025-01-16 PROCEDURE — P9603 ONE-WAY ALLOW PRORATED MILES: HCPCS

## 2025-01-17 ENCOUNTER — HOSPITAL ENCOUNTER (OUTPATIENT)
Age: 75
Setting detail: SPECIMEN
Discharge: HOME | End: 2025-01-17

## 2025-01-17 LAB
ALBUMIN SERPL-MCNC: 3 G/DL (ref 3.5–5.2)
ALBUMIN/GLOB SERPL: 1 {RATIO} (ref 1–2.5)
ALP SERPL-CCNC: 78 U/L (ref 40–129)
ALT SERPL-CCNC: 25 U/L (ref 10–50)
AST SERPL-CCNC: 27 U/L (ref 10–50)
BILIRUB DIRECT SERPL-MCNC: 0.3 MG/DL (ref 0–0.2)
BILIRUB INDIRECT SERPL-MCNC: 0.2 MG/DL
BILIRUB SERPL-MCNC: 0.5 MG/DL (ref 0–1.2)
CREAT SERPL-MCNC: 0.9 MG/DL (ref 0.7–1.2)
GFR, ESTIMATED: 88 ML/MIN/1.73M2
PLATELET # BLD AUTO: 202 K/UL (ref 138–453)
PROT SERPL-MCNC: 6.1 G/DL (ref 6.6–8.7)
WBC OTHER # BLD: 5.4 K/UL (ref 3.5–11.3)

## 2025-01-17 PROCEDURE — 87040 BLOOD CULTURE FOR BACTERIA: CPT

## 2025-01-17 PROCEDURE — 82565 ASSAY OF CREATININE: CPT

## 2025-01-17 PROCEDURE — 36415 COLL VENOUS BLD VENIPUNCTURE: CPT

## 2025-01-17 PROCEDURE — 85049 AUTOMATED PLATELET COUNT: CPT

## 2025-01-17 PROCEDURE — P9603 ONE-WAY ALLOW PRORATED MILES: HCPCS

## 2025-01-17 PROCEDURE — 85048 AUTOMATED LEUKOCYTE COUNT: CPT

## 2025-01-17 PROCEDURE — 80076 HEPATIC FUNCTION PANEL: CPT

## 2025-01-19 ENCOUNTER — HOSPITAL ENCOUNTER (OUTPATIENT)
Age: 75
Setting detail: SPECIMEN
Discharge: HOME | End: 2025-01-19

## 2025-01-19 LAB
INR PPP: 2.7
MICROORGANISM SPEC CULT: NORMAL
MICROORGANISM SPEC CULT: NORMAL
PROTHROMBIN TIME: 28.5 SEC (ref 11.5–14.2)
SERVICE CMNT-IMP: NORMAL
SERVICE CMNT-IMP: NORMAL
SPECIMEN DESCRIPTION: NORMAL
SPECIMEN DESCRIPTION: NORMAL

## 2025-01-19 PROCEDURE — 36415 COLL VENOUS BLD VENIPUNCTURE: CPT

## 2025-01-19 PROCEDURE — 85610 PROTHROMBIN TIME: CPT

## 2025-01-20 ENCOUNTER — HOSPITAL ENCOUNTER (OUTPATIENT)
Age: 75
Setting detail: SPECIMEN
Discharge: HOME | End: 2025-01-20

## 2025-01-20 LAB
ANION GAP SERPL CALCULATED.3IONS-SCNC: 10 MMOL/L (ref 9–16)
BUN SERPL-MCNC: 17 MG/DL (ref 8–23)
CALCIUM SERPL-MCNC: 8.6 MG/DL (ref 8.8–10.2)
CHLORIDE SERPL-SCNC: 109 MMOL/L (ref 98–107)
CO2 SERPL-SCNC: 23 MMOL/L (ref 20–31)
CREAT SERPL-MCNC: 1 MG/DL (ref 0.7–1.2)
GFR, ESTIMATED: 80 ML/MIN/1.73M2
GLUCOSE SERPL-MCNC: 113 MG/DL (ref 82–115)
POTASSIUM SERPL-SCNC: 3.8 MMOL/L (ref 3.7–5.3)
SODIUM SERPL-SCNC: 141 MMOL/L (ref 136–145)

## 2025-01-20 PROCEDURE — 36415 COLL VENOUS BLD VENIPUNCTURE: CPT

## 2025-01-20 PROCEDURE — P9603 ONE-WAY ALLOW PRORATED MILES: HCPCS

## 2025-01-20 PROCEDURE — 80048 BASIC METABOLIC PNL TOTAL CA: CPT

## 2025-01-27 ENCOUNTER — HOSPITAL ENCOUNTER (OUTPATIENT)
Age: 75
Setting detail: SPECIMEN
Discharge: HOME | End: 2025-01-27

## 2025-01-27 LAB
ANION GAP SERPL CALCULATED.3IONS-SCNC: 8 MMOL/L (ref 9–16)
BUN SERPL-MCNC: 17 MG/DL (ref 8–23)
CALCIUM SERPL-MCNC: 9.4 MG/DL (ref 8.8–10.2)
CHLORIDE SERPL-SCNC: 103 MMOL/L (ref 98–107)
CO2 SERPL-SCNC: 27 MMOL/L (ref 20–31)
CREAT SERPL-MCNC: 0.8 MG/DL (ref 0.7–1.2)
GFR, ESTIMATED: >90 ML/MIN/1.73M2
GLUCOSE SERPL-MCNC: 230 MG/DL (ref 82–115)
POTASSIUM SERPL-SCNC: 4.4 MMOL/L (ref 3.7–5.3)
SODIUM SERPL-SCNC: 138 MMOL/L (ref 136–145)

## 2025-01-27 PROCEDURE — 36415 COLL VENOUS BLD VENIPUNCTURE: CPT

## 2025-01-27 PROCEDURE — 80048 BASIC METABOLIC PNL TOTAL CA: CPT

## 2025-01-27 PROCEDURE — P9603 ONE-WAY ALLOW PRORATED MILES: HCPCS

## 2025-02-03 ENCOUNTER — HOSPITAL ENCOUNTER (OUTPATIENT)
Age: 75
Setting detail: SPECIMEN
Discharge: HOME | End: 2025-02-03

## 2025-02-03 LAB
ANION GAP SERPL CALCULATED.3IONS-SCNC: 10 MMOL/L (ref 9–16)
BUN SERPL-MCNC: 25 MG/DL (ref 8–23)
CALCIUM SERPL-MCNC: 8.8 MG/DL (ref 8.8–10.2)
CHLORIDE SERPL-SCNC: 107 MMOL/L (ref 98–107)
CO2 SERPL-SCNC: 23 MMOL/L (ref 20–31)
CREAT SERPL-MCNC: 0.9 MG/DL (ref 0.7–1.2)
GFR, ESTIMATED: 86 ML/MIN/1.73M2
GLUCOSE SERPL-MCNC: 103 MG/DL (ref 82–115)
POTASSIUM SERPL-SCNC: 4.2 MMOL/L (ref 3.7–5.3)
SODIUM SERPL-SCNC: 139 MMOL/L (ref 136–145)

## 2025-02-03 PROCEDURE — 80048 BASIC METABOLIC PNL TOTAL CA: CPT

## 2025-02-03 PROCEDURE — 36415 COLL VENOUS BLD VENIPUNCTURE: CPT

## 2025-02-13 ENCOUNTER — HOSPITAL ENCOUNTER (OUTPATIENT)
Age: 75
Setting detail: SPECIMEN
Discharge: HOME | End: 2025-02-13
Payer: MEDICARE

## 2025-02-13 LAB — POTASSIUM SERPL-SCNC: 4.6 MMOL/L (ref 3.7–5.3)

## 2025-02-13 PROCEDURE — 84132 ASSAY OF SERUM POTASSIUM: CPT

## (undated) DEVICE — GLOVE ORANGE PI 8 1/2   MSG9085

## (undated) DEVICE — NEEDLE BX ASPIR SPNL TIPCM MRK AND NDL STP 22GAX20CM

## (undated) DEVICE — MONOPTY® DISPOSABLE CORE BIOPSY INSTRUMENT, 22MM PENETRATION DEPTH, 18G X 20CM: Brand: MONOPTY

## (undated) DEVICE — PACK PROCEDURE SURG CYSTO SVMMC LF

## (undated) DEVICE — CONTAINER,SPECIMEN,4OZ,OR STRL: Brand: MEDLINE